# Patient Record
Sex: FEMALE | Race: WHITE | NOT HISPANIC OR LATINO | Employment: OTHER | URBAN - METROPOLITAN AREA
[De-identification: names, ages, dates, MRNs, and addresses within clinical notes are randomized per-mention and may not be internally consistent; named-entity substitution may affect disease eponyms.]

---

## 2017-03-22 ENCOUNTER — OFFICE VISIT (OUTPATIENT)
Dept: PHYSICAL MEDICINE AND REHAB | Facility: CLINIC | Age: 72
End: 2017-03-22

## 2017-03-22 VITALS — DIASTOLIC BLOOD PRESSURE: 73 MMHG | HEIGHT: 60 IN | HEART RATE: 83 BPM | SYSTOLIC BLOOD PRESSURE: 179 MMHG

## 2017-03-22 DIAGNOSIS — M62.838 MUSCLE SPASM: ICD-10-CM

## 2017-03-22 DIAGNOSIS — G80.1 SPASTIC DIPLEGIC CEREBRAL PALSY (H): Primary | ICD-10-CM

## 2017-03-22 RX ORDER — MULTIVIT WITH MINERALS/LUTEIN
1 TABLET ORAL DAILY
COMMUNITY

## 2017-03-22 RX ORDER — BACLOFEN 10 MG/1
10-20 TABLET ORAL 3 TIMES DAILY
Qty: 180 TABLET | Refills: 11 | Status: SHIPPED | OUTPATIENT
Start: 2017-03-22 | End: 2018-07-11

## 2017-03-22 ASSESSMENT — PAIN SCALES - GENERAL: PAINLEVEL: WORST PAIN (10)

## 2017-03-22 NOTE — MR AVS SNAPSHOT
After Visit Summary   3/22/2017    Benji Shankar    MRN: [id number]           Patient Information     Date Of Birth          1945        Visit Information        Provider Department      3/22/2017 11:20 AM Domingo Posey MD [facility]        Today's Diagnoses     Muscle spasm    -  1      Care Instructions    1. To address spasticity and knees pinching together, first try increasing baclofen first to 15 mg three times daily for a week. Consider then increasing to 20 mg times daily. Monitor for sleepiness.    2. Try putting in some padding / towels etc on side of thighs to simulate a narrower chair and if this is helping the discomfort.    3. Will pursue the equipment you outlined including bed pan, shower bench and gel wheelchair cushion.    4. I'll look into if there is a topical medication that would be helpful.    5. Allow your feet to rotate outward some to their natural resting position to see if this minimizes the ache in feet.    6. Monitor the red spot on top of your feet - shoes may be too tight. Can trial with AFO's on but shoes off when at home to see if the red area is less and more comfortable.     7. Physical therapy with Laurie at the [facility] to work on transfers, exercises and positioning in wheelchair.    8. See me in a few months.          Follow-ups after your visit        Your next 10 appointments already scheduled     Jul 26, 2017 10:40 AM CDT   (Arrive by 10:25 AM)   Return Visit with Domingo Posey MD   [facility] ([facility] and [place])    [address] [place] [id number] [phone]              Who to contact     Please call your clinic at [phone] to:    Ask questions about your health    Make or cancel appointments    Discuss your medicines    Learn about your test results    Speak to your doctor   If you have compliments or concerns about  an experience at your clinic, or if you wish to file a complaint, please contact [facility] Patient Relations at [phone] or email us at [email or web address]         Additional Information About Your Visit        [employer] Information     V[employer]im[employer] is an electronic gateway that provides easy, online access to your medical records. With Vizimax, you can request a clinic appointment, read your test results, renew a prescription or communicate with your care team.     To sign up for Vizimax visit the website at [email or web address]   You will be asked to enter the access code listed below, as well as some personal information. Please follow the directions to create your username and password.     Your access code is: [email or web address]  Expires: 2017  7:30 AM     Your access code will  in 90 days. If you need help or a new code, please contact your [facility] or call [phone] for assistance.        Care EveryWhere ID     This is your Care EveryWhere ID. This could be used by other organizations to access your [facility] medical records  [id number]        Your Vitals Were     Pulse Height                83 1.524 m (5')           Blood Pressure from Last 3 Encounters:   17 179/73   16 155/77   03/10/14 133/72    Weight from Last 3 Encounters:   16 64.4 kg (142 lb)   03/10/14 62.6 kg (138 lb)   12 61.2 kg (135 lb)              Today, you had the following     No orders found for display         Today's Medication Changes          These changes are accurate as of: 3/22/17 12:54 PM.  If you have any questions, ask your nurse or doctor.               These medicines have changed or have updated prescriptions.        Dose/Directions    baclofen 10 MG tablet   Commonly known as:  LIORESAL   This may have changed:  how much to take   Used for:  Muscle spasm   Changed by:  Domingo Posey MD        Dose:  10-20 mg   Take  1-2 tablets (10-20 mg) by mouth 3 times daily   Quantity:  180 tablet   Refills:  11            Where to get your medicines      These medications were sent to [facility]/pharmacy #[id number] - [place], [place] - [address] [facility]  [address] [place], [place] [address] [address]     Phone:  [phone]     baclofen 10 MG tablet                Primary Care Provider Office Phone # Fax #    Marcus Santana [phone]       [facility] [address]  [facility] [address]        Thank you!     Thank you for choosing [facility] PHYSICAL MEDICINE AND REHABILITATION  for your care. Our goal is always to provide you with excellent care. Hearing back from our patients is one way we can continue to improve our services. Please take a few minutes to complete the written survey that you may receive in the mail after your visit with us. Thank you!             Your Updated Medication List - Protect others around you: Learn how to safely use, store and throw away your medicines at www.disposemymeds.org.          This list is accurate as of: 3/22/17 12:54 PM.  Always use your most recent med list.                   Brand Name Dispense Instructions for use    ACETAMIN 500 MG tablet   Generic drug:  acetaminophen      Take 1-2 tablets by mouth every 6 hours as needed.       albuterol 90 MCG/ACT inhaler      Inhale 2 puffs into the lungs every 6 hours as needed.       baclofen 10 MG tablet    LIORESAL    180 tablet    Take 1-2 tablets (10-20 mg) by mouth 3 times daily       BUPROPION HCL PO      Take 150 mg by mouth daily       CALTRATE 600+D PLUS PO      Take 2 tablets by mouth daily.       captopril 50 MG tablet    CAPOTEN     Take  by mouth. 2 tablets in am and 1 tablet in pm       CENTRUM SILVER per tablet      Take 1 tablet by mouth daily       EVISTA 60 MG tablet   Generic drug:  raloxifene      Take 60 mg by mouth daily.       fluticasone 100 MCG/BLIST Aepb    FLOVENT DISKUS     Inhale 2 puffs into the lungs  every 12 hours       gabapentin 8 % Gel topical PLO cream     60 g    Apply 2-4 g topically 2 times daily as needed To bilateral feet       HYDROcodone-acetaminophen 5-325 MG per tablet    NORCO    30 tablet    Take 1 tablet by mouth every 4 hours as needed.       priLOSEC 20 MG CR capsule   Generic drug:  omeprazole      Take 20 mg by mouth 2 times daily.       QVAR 40 MCG/ACT Inhaler   Generic drug:  beclomethasone      Inhale 2 puffs into the lungs 2 times daily as needed.       triamterene-hydrochlorothiazide 37.5-25 MG per tablet    MAXZIDE-25     Take 1 tablet by mouth daily.       ZOLOFT 100 MG tablet   Generic drug:  sertraline      Take 100 mg by mouth daily.

## 2017-03-22 NOTE — LETTER
3/22/2017       RE: Benji Shankar  [address]    [facility] [address]     Dear Colleague,    Thank you for referring your patient, Benji Shankar, to the [facility] PH[facility] AND REHABILITATION at [facility]. Please see a copy of my visit note below.    HISTORY OF PRESENT ILLNESS:  Benji, who goes by Ahsan, is a pleasant now 72-year-old woman with long-standing spastic diplegic cerebral palsy who I last saw March 2016. She returns today with concerns of worsening function. She's having harder time with transfers. Her PCA Sofiya can do this assist though some other care givers are not able to do this as well and has had to resort some to bedpan use. Finds with toileting her legs don't open as well preferring to adduct causing some incontinence and urination on the floor even when seated over toilet.She feels some stiffness especially with leg adduction but not necessarily with full muscle cramping. She takes baclofen 10 mg TID. Doesn't recall using higher doses. For bowel and bladder management, Ahsan has been dependent for assistance to transfer to the toilet for a longer time. Overnight she wears protective undergarments to collect any bladder incontinence.     She has a lot of aches and pains. Her feet and ankles in particular but her buttocks and back as well. She has a power wheel chair with tilt and power leg elevation. Assist transfer in / out including toilet but is otherwise up independent in this most all the day. She reports the wheelchair never has fit well. Had complained of issues in the past when first getting it but they never found something that helped make it more comfortable. She feels it's too wide and can bump into doors or objects as she's not as used to the wider footprint. For her pain in the past there was a component of some bothersome tingling in her feet. Discussed oral medication options in past but  "wanting to minimize any more pills thus had Rx'ed some topical gabapentin gel. This was denied as non-formulary. She has a letter with her from pharmacy saying the \"Pentravan Plus\" cream is approved though that's not at all familiar to me nor do I feel I ordered that. Exploring what it may be, it may be carrier emollient cream that possibly could be used to compound the gabapentin into. Will need to explore this further.    Ahsan also is concerned her feet are turning out more and needing to be strapped to the w/c foot plate. She wears bilateral AFO's into shoes.     PHYSICAL EXAMINATION:    /73 (BP Location: Left arm, Patient Position: Chair, Cuff Size: Adult Regular)  Pulse 83  Ht 5' (1.524 m)   In general, Ahsan is alert, rather tearful today with concerns of loosing function. She is a good historian with fluent speech and language.  She arrives with her J6 power wheelchair, power tilt and independent foot/leg elevation, left side joystick control, flip up foot plate. Width of seat actually looks good. Has some obesity to buttocks in particular and there isn't much room for narrower chair. Would need to be pushed in against skin / thighs though some people prefer this controlled / held in fit. Her feet are Velcro strapped down. Cushion is a contoured foam, no gel or air. She has bilateral tibial torsion with external rotation which is longstanding for her though note the shoes are strapped in pointing more foreward.  She wears bilateral solid ankle-foot orthoses into light weight tennis shoes.  I removed these today, the right food on dorsum over 1st metatarsal area has blanchable erythema. No skin breakdown. There is some swelling in bilateral feet 1-2+. There is some slight venous congestion and purple color easily blanchable.  Palpation through the arches has some tenderness especially on left. Also some tenderness palpation over erythematous area as above. She has some pes planus as well as " "metatarsal lateral deviation slightly.   Tone exam isn't particularly elevated today. While there is some hip adduction and knee flex / ext tone, would grade it just 1/4 Leopoldo. There is some restriction on end range with all of these though within the range there isn't a lot of resistance.     ASSESSMENT AND RECOMMENDATIONS: Overall suspect no new neurologic issues but rather some aging in person with static CP.    1. To address spasticity and knees pinching together, first try increasing baclofen first to 15 mg three times daily for a week. Consider then increasing to 20 mg times daily. Monitor for sleepiness. May have some more dynamic tone and for example during toileting, there may well be more adductor tone impacting the positioning and function. If not effective with oral medication trial, could consider injection medication to adductors.    2. Wheelchair \"not fitting\" or at least complaint of being uncomfortable. Suspect pain may be multifactorial though not sure it's the fit. Possibly there foot plate elevation could be raised. Noting with leg extension in w/c this sometimes pushes up too much. For now can trial putting in some padding / towels etc on side of thighs to simulate a narrower chair and if this is helping the discomfort, then could pursue something more durable. Suspect with the duration she's in the chair a gel wheelchair cushion may be more comfortable. Without skin breakdown reported, a ROHO may be too soft and harder to get in / out. Will have w/c expertise / insights from Renetta THACKER at [facility].    3. Will pursue the equipment she's needing including larger bed pan, shower bench. She has used Dominic Matamoros at [facility], medical .    4. Will look into if there is a topical medication that would be helpful but suspect it's more positioning.     5. Allow your feet to rotate outward some to their natural resting position to see if this minimizes the ache in " feet. Shifting the anchor location on the foot Velcro straps may be helpful.     6. Monitor the red spot on top of your feet - shoes may be too tight. Can trial with AFO's on but shoes off when at home to see if the red area is less and more comfortable.     7. Physical therapy with Laurie (has worked with in the past) at the [facility] to work on transfers, exercises and positioning in wheelchair.     8. Follow up with Benji in 3 months.  She will contact us sooner, especially if we want to try alternate options for her lower extremity pains.  I educated her that if the paresthesias worsen or start to progress more proximally, I would likely recommend an EMG at that time.      Seventy minutes spent in direct patient interaction, greater than 50% counseling and education of above detailed items.      Domingo Posey MD

## 2017-03-22 NOTE — PATIENT INSTRUCTIONS
1. To address spasticity and knees pinching together, first try increasing baclofen first to 15 mg three times daily for a week. Consider then increasing to 20 mg times daily. Monitor for sleepiness.    2. Try putting in some padding / towels etc on side of thighs to simulate a narrower chair and if this is helping the discomfort.    3. Will pursue the equipment you outlined including bed pan, shower bench and gel wheelchair cushion.    4. I'll look into if there is a topical medication that would be helpful.    5. Allow your feet to rotate outward some to their natural resting position to see if this minimizes the ache in feet.    6. Monitor the red spot on top of your feet - shoes may be too tight. Can trial with AFO's on but shoes off when at home to see if the red area is less and more comfortable.     7. Physical therapy with Laurie at the [facility] to work on transfers, exercises and positioning in wheelchair.    8. See me in a few months.

## 2017-03-25 NOTE — PROGRESS NOTES
"HISTORY OF PRESENT ILLNESS:  Benji, who goes by Ahsan, is a pleasant now 72-year-old woman with long-standing spastic diplegic cerebral palsy who I last saw March 2016. She returns today with concerns of worsening function. She's having harder time with transfers. Her PCA Sofiya can do this assist though some other care givers are not able to do this as well and has had to resort some to bedpan use. Finds with toileting her legs don't open as well preferring to adduct causing some incontinence and urination on the floor even when seated over toilet.She feels some stiffness especially with leg adduction but not necessarily with full muscle cramping. She takes baclofen 10 mg TID. Doesn't recall using higher doses. For bowel and bladder management, Ahsan has been dependent for assistance to transfer to the toilet for a longer time. Overnight she wears protective undergarments to collect any bladder incontinence.     She has a lot of aches and pains. Her feet and ankles in particular but her buttocks and back as well. She has a power wheel chair with tilt and power leg elevation. Assist transfer in / out including toilet but is otherwise up independent in this most all the day. She reports the wheelchair never has fit well. Had complained of issues in the past when first getting it but they never found something that helped make it more comfortable. She feels it's too wide and can bump into doors or objects as she's not as used to the wider footprint. For her pain in the past there was a component of some bothersome tingling in her feet. Discussed oral medication options in past but wanting to minimize any more pills thus had Rx'ed some topical gabapentin gel. This was denied as non-formulary. She has a letter with her from pharmacy saying the \"Pentravan Plus\" cream is approved though that's not at all familiar to me nor do I feel I ordered that. Exploring what it may be, it may be carrier emollient cream that " possibly could be used to compound the gabapentin into. Will need to explore this further.    Ahsan also is concerned her feet are turning out more and needing to be strapped to the w/c foot plate. She wears bilateral AFO's into shoes.     PHYSICAL EXAMINATION:    /73 (BP Location: Left arm, Patient Position: Chair, Cuff Size: Adult Regular)  Pulse 83  Ht 5' (1.524 m)   In general, Ahsan is alert, rather tearful today with concerns of loosing function. She is a good historian with fluent speech and language.  She arrives with her J6 power wheelchair, power tilt and independent foot/leg elevation, left side joystick control, flip up foot plate. Width of seat actually looks good. Has some obesity to buttocks in particular and there isn't much room for narrower chair. Would need to be pushed in against skin / thighs though some people prefer this controlled / held in fit. Her feet are Velcro strapped down. Cushion is a contoured foam, no gel or air. She has bilateral tibial torsion with external rotation which is longstanding for her though note the shoes are strapped in pointing more foreward.  She wears bilateral solid ankle-foot orthoses into light weight tennis shoes.  I removed these today, the right food on dorsum over 1st metatarsal area has blanchable erythema. No skin breakdown. There is some swelling in bilateral feet 1-2+. There is some slight venous congestion and purple color easily blanchable.  Palpation through the arches has some tenderness especially on left. Also some tenderness palpation over erythematous area as above. She has some pes planus as well as metatarsal lateral deviation slightly.   Tone exam isn't particularly elevated today. While there is some hip adduction and knee flex / ext tone, would grade it just 1/4 Leopoldo. There is some restriction on end range with all of these though within the range there isn't a lot of resistance.     ASSESSMENT AND RECOMMENDATIONS: Overall  "suspect no new neurologic issues but rather some aging in person with static CP.    1. To address spasticity and knees pinching together, first try increasing baclofen first to 15 mg three times daily for a week. Consider then increasing to 20 mg times daily. Monitor for sleepiness. May have some more dynamic tone and for example during toileting, there may well be more adductor tone impacting the positioning and function. If not effective with oral medication trial, could consider injection medication to adductors.    2. Wheelchair \"not fitting\" or at least complaint of being uncomfortable. Suspect pain may be multifactorial though not sure it's the fit. Possibly there foot plate elevation could be raised. Noting with leg extension in w/c this sometimes pushes up too much. For now can trial putting in some padding / towels etc on side of thighs to simulate a narrower chair and if this is helping the discomfort, then could pursue something more durable. Suspect with the duration she's in the chair a gel wheelchair cushion may be more comfortable. Without skin breakdown reported, a ROHO may be too soft and harder to get in / out. Will have w/c expertise / insights from Renetta THACKER at [facility].    3. Will pursue the equipment she's needing including larger bed pan, shower bench. She has used Dominic Matamoros at [facility], medical .    4. Will look into if there is a topical medication that would be helpful but suspect it's more positioning.     5. Allow your feet to rotate outward some to their natural resting position to see if this minimizes the ache in feet. Shifting the anchor location on the foot Velcro straps may be helpful.     6. Monitor the red spot on top of your feet - shoes may be too tight. Can trial with AFO's on but shoes off when at home to see if the red area is less and more comfortable.     7. Physical therapy with Laurie (has worked with in the past) at the [facility] to work on transfers, exercises and positioning in wheelchair.     8. Follow up with Benji in 3 months.  She will contact us sooner, especially if we want to try alternate options for her lower extremity pains.  I educated her that if the paresthesias worsen or start to progress more proximally, I would likely recommend an EMG at that time.      Seventy minutes spent in direct patient interaction, greater than 50% counseling and education of above detailed items.      Domingo Posey MD

## 2017-03-27 ENCOUNTER — CARE COORDINATION (OUTPATIENT)
Dept: PHYSICAL MEDICINE AND REHAB | Facility: CLINIC | Age: 72
End: 2017-03-27

## 2017-03-27 NOTE — PROGRESS NOTES
Orders from Dr Posey for an extended tub bench, extra large bed pan, and a gel wheelchair cushion was faxed to [facility] [phone] per patient's request.

## 2017-04-11 ENCOUNTER — HOSPITAL ENCOUNTER (OUTPATIENT)
Dept: PHYSICAL THERAPY | Facility: CLINIC | Age: 72
Setting detail: THERAPIES SERIES
End: 2017-04-11
Attending: PHYSICAL MEDICINE & REHABILITATION
Payer: COMMERCIAL

## 2017-04-11 PROCEDURE — 97530 THERAPEUTIC ACTIVITIES: CPT | Mod: GP | Performed by: PHYSICAL THERAPIST

## 2017-04-11 PROCEDURE — 97162 PT EVAL MOD COMPLEX 30 MIN: CPT | Mod: GP | Performed by: PHYSICAL THERAPIST

## 2017-04-11 PROCEDURE — 40000719 ZZHC STATISTIC PT DEPARTMENT NEURO VISIT: Performed by: PHYSICAL THERAPIST

## 2017-04-11 PROCEDURE — 97542 WHEELCHAIR MNGMENT TRAINING: CPT | Mod: GP | Performed by: PHYSICAL THERAPIST

## 2017-04-12 NOTE — PROGRESS NOTES
" 04/11/17 1500   Quick Adds   Type of Visit Initial OP PT Evaluation   General Information   Start of Care Date 04/11/17   Referring Physician Dr Domingo Posey   Orders Evaluate and Treat as Indicated   Order Date 03/24/17   Medical Diagnosis Spastic diplegic cerebral palsy    Onset of illness/injury or Date of Surgery 03/24/17   Special Instructions Special Instructions/Modalities: 71 yo with CP, some worsening abilities in transfers and some spasticity. See for optimizing ROM, HEP and function. Special Programs: Has worked most with Laurie Stein at [facility] in past. Not for years. See PM&R note for thoughts.    Surgical/Medical history reviewed Yes   Pertinent history of current problem (include personal factors and/or comorbidities that impact the POC) Pt is known to this clinician from past episode. Pt with CP, been primary power w/c user for years. Lately with more pain while in power w/c (issued 2014) - states it has never been right, very frustrated with that. Changed from [facility] to [facility] ([facility]) due to dissatisfaction with response from [facility]. Pt notes that transfers are getting harder - has been left bedridden a couple of weekends becuase weekend PCA couldn't get her transferred. Pt noted they did try using her [employer] [place] standing frame as a transfer device, but the strap got in the way of hygeine cares/clothing mangement. Pt notes she does standing frame 1-2hrs per day, has not been doing her strengthening ex for UEs and trunk. Complexity: chronic pain with \"arthritis all over.\" CP immobility.   Patient role/Employment history Disabled   Living environment Apartment/condo   Home/Community Accessibility Comments elevator   Current Assistive Devices Power Wheel Chair;Transfer Board   ADL Devices Wall Grab Bar;Other  (taller toilet)   Patient/Family Goals Statement I want to feel better in my w/c and be able to transfer with my weekend person.   General Information Comments PCAs: " "Sofiya and Eun. 4-6hrs per day. AM and PM cares, showering, dressing, transferring and toileting, cleaning and laundry. Doing own cooking, taking Star (dog) out to potty.  [employer] or Sofiya will drive.   Fall Risk Screen   Fall screen completed by PT   Per patient - Fall 2 or more times in past year? Yes   Per patient - Fall with injury in past year? No   Is patient a fall risk? Yes   Fall screen comments pt notes she couldn't tell me when or how she has fallen, but it was \"2 or 3x\".   System Outcome Measures   AM-PAC  Basic Mobility Score Level  (Lower scores equate to lower levels of function) 45.73  (ck)   AM-PAC  Daily Activity Score Level  (Lower scores equate to lower levels of function) 47.69  (ck)   AM-PAC  Applied Cognitive Score Level  (Lower scores equate to lower levels of function) 39.87  (cj)   Pain   Patient currently in pain Yes   Pain location \"All over\" worse since got this power w/c.   Pain description comment pt notes it is worse in her sit bones of pelvis, and HS.   Cognitive Status Examination   Orientation orientation to person, place and time   Level of Consciousness alert   Follows Commands and Answers Questions 100% of the time;able to follow multistep instructions   Personal Safety and Judgment intact   Memory intact   Posture   Posture Comments seated: sacral sitting, forward head, protracted shoulders. Standing in // bars: wide WENDIE, but significant adduction of knees (WB on medal aspects of feet, especially R), knee and hip flex ~30* B - firm positioning.   Range of Motion (ROM)   ROM Comment DF 10*, but with excessive pronation. HS 90-90 R -10*, L -25*. Pt not assessed in supine today due to other issues being addressed. UEs WNL.   MMT: Hip, Rehab Eval   Hip Flexion - Left Side (3-/5) fair minus, left   Hip ABduction - Left Side (3-/5) fair minus, left   Hip ADduction - Left Side (3/5) fair, left   Hip Flexion - Right Side (2+/5) poor plus, right   Hip ABduction - Right Side " (2+/5) poor plus, right   Hip ADduction - Right Side (3-/5) fair minus, right   MMT: Knee, Rehab Eval   Knee Flexion - Left Side (3-/5) fair minus, left   Knee Extension - Left Side (3+/5) fair plus, left   Knee Flexion - Right Side (1/5) trace, right   Knee Extension - Right Side (3-/5) fair minus, right   Bed Mobility   Bed Mobility Comments NT today. Per pt report, max A with bedrail.   Transfer Skills   Transfer Comments NT today - focus of next session. Min A sit/ // bars to flexed position (see above). Per pt, she stands with use of bar on bed, max A pivot via PCA, then sits and max A sit/sup and reposition in bed.   Locomotion   Wheel Chair Mobility Comments mod I power w/c.   Balance   Balance Comments Pt unable to lean forward to manage footrest in w/c. Pt is able to sit forward in w/c with feet on floor mod I.   Muscle Tone   Muscle Tone Comments Leopoldo 1-2 throughout B LEs. With standing, no signficant spasticity noted functionally with adductors, possibly with HS. Pt noted she increased baclofen from 10 to 15mg.   Planned Therapy Interventions   Planned Therapy Interventions neuromuscular re-education;ROM;strengthening;stretching;transfer training;manual therapy;balance training;bed mobility training   Planned Therapy Interventions Comment w/c positioing troubleshooting, potential orthotics change.   Clinical Impression   Criteria for Skilled Therapeutic Interventions Met yes, treatment indicated   PT Diagnosis deconditioning, impaired transfers and bed mobility.   Influenced by the following impairments decreased LE strength, increasd LE tone, decreased activity tolerance, impaired posture, chronic OA pain.   Functional limitations due to impairments discomfort and impaired positioning in power w/c, impaired bed mobility and transfers.   Clinical Presentation Evolving/Changing   Clinical Presentation Rationale pt has had decline in function in the last few months, resulting in being bedridden  on some weekends.   Clinical Decision Making (Complexity) Moderate complexity   Therapy Frequency 1 time/week   Predicted Duration of Therapy Intervention (days/wks) 90 days   Risk & Benefits of therapy have been explained Yes   Patient, Family & other staff in agreement with plan of care Yes   Clinical Impression Comments potential for home safety eval due to pt's specific environmental needs for transferring/bed mobility.   Goal 1   Goal Identifier Transfers   Goal Description Pt demo ability to transfer with weekend staff consistently via use of improved techniques and/or equipment.   Target Date 07/09/17   Goal 2   Goal Identifier Comfort in power w/c   Goal Description Pt demo improved positioning in power w/c as result of modifications made via collaboration with vendor, and improved trunk/LE ROM.   Target Date 07/09/17   Goal 3   Goal Identifier HEP   Goal Description Pt demo indep with (including possible direction of PCAs) HEP for LE/trunk/UE ROM and strength for ongoing wellness.   Target Date 07/09/17   Total Evaluation Time   Total Evaluation Time (Minutes) 35

## 2017-04-18 ENCOUNTER — HOSPITAL ENCOUNTER (OUTPATIENT)
Dept: PHYSICAL THERAPY | Facility: CLINIC | Age: 72
Setting detail: THERAPIES SERIES
End: 2017-04-18
Attending: PHYSICAL MEDICINE & REHABILITATION
Payer: COMMERCIAL

## 2017-04-18 PROCEDURE — 97530 THERAPEUTIC ACTIVITIES: CPT | Mod: GP | Performed by: PHYSICAL THERAPIST

## 2017-04-18 PROCEDURE — 40000719 ZZHC STATISTIC PT DEPARTMENT NEURO VISIT: Performed by: PHYSICAL THERAPIST

## 2017-04-25 ENCOUNTER — HOSPITAL ENCOUNTER (OUTPATIENT)
Dept: PHYSICAL THERAPY | Facility: CLINIC | Age: 72
Setting detail: THERAPIES SERIES
End: 2017-04-25
Attending: PHYSICAL MEDICINE & REHABILITATION
Payer: COMMERCIAL

## 2017-04-25 PROCEDURE — 40000719 ZZHC STATISTIC PT DEPARTMENT NEURO VISIT: Performed by: PHYSICAL THERAPIST

## 2017-04-25 PROCEDURE — 97530 THERAPEUTIC ACTIVITIES: CPT | Mod: GP | Performed by: PHYSICAL THERAPIST

## 2017-05-01 ENCOUNTER — HOSPITAL ENCOUNTER (OUTPATIENT)
Dept: PHYSICAL THERAPY | Facility: CLINIC | Age: 72
Setting detail: THERAPIES SERIES
End: 2017-05-01
Attending: PHYSICAL MEDICINE & REHABILITATION
Payer: COMMERCIAL

## 2017-05-01 PROCEDURE — 40000719 ZZHC STATISTIC PT DEPARTMENT NEURO VISIT: Performed by: PHYSICAL THERAPIST

## 2017-05-01 PROCEDURE — 97110 THERAPEUTIC EXERCISES: CPT | Mod: GP | Performed by: PHYSICAL THERAPIST

## 2017-05-09 ENCOUNTER — HOSPITAL ENCOUNTER (OUTPATIENT)
Dept: PHYSICAL THERAPY | Facility: CLINIC | Age: 72
Setting detail: THERAPIES SERIES
End: 2017-05-09
Attending: PHYSICAL MEDICINE & REHABILITATION
Payer: COMMERCIAL

## 2017-05-09 PROCEDURE — 97530 THERAPEUTIC ACTIVITIES: CPT | Mod: GP | Performed by: PHYSICAL THERAPIST

## 2017-05-09 PROCEDURE — 40000719 ZZHC STATISTIC PT DEPARTMENT NEURO VISIT: Performed by: PHYSICAL THERAPIST

## 2017-05-16 ENCOUNTER — HOSPITAL ENCOUNTER (OUTPATIENT)
Dept: PHYSICAL THERAPY | Facility: CLINIC | Age: 72
Setting detail: THERAPIES SERIES
End: 2017-05-16
Attending: PHYSICAL MEDICINE & REHABILITATION
Payer: COMMERCIAL

## 2017-05-16 PROCEDURE — 40000719 ZZHC STATISTIC PT DEPARTMENT NEURO VISIT: Performed by: PHYSICAL THERAPIST

## 2017-05-16 PROCEDURE — 97530 THERAPEUTIC ACTIVITIES: CPT | Mod: GP | Performed by: PHYSICAL THERAPIST

## 2017-05-16 NOTE — DISCHARGE INSTRUCTIONS
Please measure the height of the seat for the manual wheelchair:    AND the seat of Sofiya's passenger seat:      Either call and leave me a voicemail ([phone]) or bring it next time.

## 2017-05-16 NOTE — IP AVS SNAPSHOT
MRN:[id number]                      After Visit Summary   5/16/2017    Benji Shankar    MRN: [id number]           Visit Information        Provider Department      5/16/2017 11:45 AM Kimberly Stein, PT [facility], [place], Physical Therapy - Outpatient        Your next 10 appointments already scheduled     May 23, 2017 11:00 AM CDT   Treatment 45 with Kimberly Stein, PT   [facility], [place], Physical Therapy - Outpatient ([facility])    [address], [address]  [place] [address]   [phone]            Jun 06, 2017 10:15 AM CDT   Treatment 45 with Kimberly Stein, PT   [facility], [place], Physical Therapy - Outpatient ([facility])    [address], [address]  [place] [address]   [phone]            Jun 13, 2017 10:30 AM CDT   Treatment 45 with Kimberly Stein, PT   [facility], [place], Physical Therapy - Outpatient ([facility])    [address], [address]  [place] [address]   [phone]            Jun 20, 2017 10:30 AM CDT   Treatment 45 with Kimberyl Stein PT   [facility], [place], Physical Therapy - Outpatient ([facility])    [address], [address]  [place] [address]   [phone]            Jul 26, 2017 10:40 AM CDT   (Arrive by 10:25 AM)   Return Visit with Domingo Posey MD   [facility] and Rehabilitation ([facility])    [address] [place] [address] [id number] [phone]                Further instructions from your care team       Please measure the height of the seat for the manual wheelchair:    AND the seat of Sofiya's passenger seat:      Either call and leave me a voicemail ([phone]) or bring it next time.          MyChart Information     [employer] lets you send messages to your doctor, view your test results, renew  "your prescriptions, schedule appointments and more. To sign up, go to [email or web address] . Click on \"Log in\" on the left side of the screen, which will take you to the Welcome page. Then click on \"Sign up Now\" on the right side of the page.     You will be asked to enter the access code listed below, as well as some personal information. Please follow the directions to create your username and password.     Your access code is: [email or web address]  Expires: 2017  7:30 AM     Your access code will  in 90 days. If you need help or a new code, please call your [place] clinic or [phone].        Care EveryWhere ID     This is your Care EveryWhere ID. This could be used by other organizations to access your [place] medical records  [id number]        "

## 2017-05-23 ENCOUNTER — HOSPITAL ENCOUNTER (OUTPATIENT)
Dept: PHYSICAL THERAPY | Facility: CLINIC | Age: 72
Setting detail: THERAPIES SERIES
End: 2017-05-23
Attending: PHYSICAL MEDICINE & REHABILITATION
Payer: COMMERCIAL

## 2017-05-23 PROCEDURE — 40000719 ZZHC STATISTIC PT DEPARTMENT NEURO VISIT: Performed by: PHYSICAL THERAPIST

## 2017-05-23 PROCEDURE — 97530 THERAPEUTIC ACTIVITIES: CPT | Mod: GP | Performed by: PHYSICAL THERAPIST

## 2017-06-06 ENCOUNTER — HOSPITAL ENCOUNTER (OUTPATIENT)
Dept: PHYSICAL THERAPY | Facility: CLINIC | Age: 72
Setting detail: THERAPIES SERIES
End: 2017-06-06
Attending: PHYSICAL MEDICINE & REHABILITATION
Payer: COMMERCIAL

## 2017-06-06 PROCEDURE — 40000719 ZZHC STATISTIC PT DEPARTMENT NEURO VISIT: Performed by: PHYSICAL THERAPIST

## 2017-06-06 PROCEDURE — 97530 THERAPEUTIC ACTIVITIES: CPT | Mod: GP | Performed by: PHYSICAL THERAPIST

## 2017-06-20 ENCOUNTER — HOSPITAL ENCOUNTER (OUTPATIENT)
Dept: PHYSICAL THERAPY | Facility: CLINIC | Age: 72
Setting detail: THERAPIES SERIES
End: 2017-06-20
Attending: PHYSICAL MEDICINE & REHABILITATION
Payer: COMMERCIAL

## 2017-06-20 PROCEDURE — 97530 THERAPEUTIC ACTIVITIES: CPT | Mod: GP | Performed by: PHYSICAL THERAPIST

## 2017-06-20 PROCEDURE — 40000719 ZZHC STATISTIC PT DEPARTMENT NEURO VISIT: Performed by: PHYSICAL THERAPIST

## 2017-07-24 ENCOUNTER — HOSPITAL ENCOUNTER (OUTPATIENT)
Dept: PHYSICAL THERAPY | Facility: CLINIC | Age: 72
Setting detail: THERAPIES SERIES
End: 2017-07-24
Attending: PHYSICAL MEDICINE & REHABILITATION
Payer: COMMERCIAL

## 2017-07-24 PROCEDURE — 40000178 ZZH STATISTIC PT HOME VISIT-NEURO/BAL: Performed by: PHYSICAL THERAPIST

## 2017-07-24 PROCEDURE — 97530 THERAPEUTIC ACTIVITIES: CPT | Mod: GP | Performed by: PHYSICAL THERAPIST

## 2017-07-26 ENCOUNTER — OFFICE VISIT (OUTPATIENT)
Dept: PHYSICAL MEDICINE AND REHAB | Facility: CLINIC | Age: 72
End: 2017-07-26

## 2017-07-26 VITALS
BODY MASS INDEX: 29.64 KG/M2 | TEMPERATURE: 97.1 F | SYSTOLIC BLOOD PRESSURE: 128 MMHG | OXYGEN SATURATION: 98 % | HEIGHT: 60 IN | WEIGHT: 151 LBS | HEART RATE: 74 BPM | DIASTOLIC BLOOD PRESSURE: 80 MMHG | RESPIRATION RATE: 24 BRPM

## 2017-07-26 DIAGNOSIS — G80.1 SPASTIC DIPLEGIC CEREBRAL PALSY (H): Primary | ICD-10-CM

## 2017-07-26 ASSESSMENT — PAIN SCALES - GENERAL: PAINLEVEL: EXTREME PAIN (8)

## 2017-07-26 NOTE — LETTER
7/26/2017       RE: Benji Shankar  [address]    [place] [address]     Dear Colleague,    Thank you for referring your patient, Benji Shankar, to the [facility] PHYSICAL MEDICINE AND REHABILITATION at [facility]. Please see a copy of my visit note below.    Benji, who goes by Ahsan, is a pleasant now 72-year-old woman with long-standing spastic diplegic cerebral palsy who I last saw March 2017. She returns today with several concerns of worsening function and swallow.   She had been hospitalized at [facility] with swallow difficulties. She has some difficulty articulating the specifics on what all had been done and recommended. She signed release of information forms for Care Everywhere and I reviewed some of the recent notes from there during her visit. Looks like there were few EGD's completed, I copied results below for EMR reference and reviewed with her. It seems there were no dysphagia changes to her diet recommended but encouraged her to chew more thoroughly and take small bites. She's scheduled for f/u with her gastroenterologist for this.    Additional concern with some slow changes over time and increasing difficulty with transfers. She has PCA support and the primary person Sofiya is a long time provider and has become friend. Sofiya is able to do stand pivot assist transfers though other providers use a sling. Ahsan had a recent home visit by physical therapist Laurie Stein. They had a trial for a Cesilia Steady which was very effective and we're pursuing a letter of necessity for this DME. Ahsan also reports some questions have been raised with her AFO's and some more purple color on her skin. This isn't painful and no skin breakdown. Her PCA monitors skin close.    Ahsan has moderate amount of tearfulness and struggles with aging with disability and slow loss of function. She is on antidepressant but hasn't done any  counseling.    PHYSICAL EXAMINATION:    /80 (BP Location: Right arm, Patient Position: Sitting, Cuff Size: Adult Large)  Pulse 74  Temp 97.1  F (36.2  C) (Oral)  Resp 24  Ht 5' (1.524 m)  Wt 151 lb (68.5 kg)  SpO2 98%  Breastfeeding? No  BMI 29.49 kg/m2   Ahsan is alert, mostly pleasant but also tearful at times. She has fluent speech and language. Arrives with her power wheelchair which has power tilt and independent foot/leg elevation, left side joystick control, flip up foot plate.  Her feet are Velcro strapped down. Bilateral solid ankle AFO in leather shoes. She has bilateral tibial torsion with external rotation which is longstanding for her.  I removed these today, there is some more broad distribution purple blanchable skin but no pressure areas noted. She has pes planus as well as metatarsal lateral deviation slightly. AFO's are older but appearing in decent shape and fitting OK. Velcro has been replaced.     ASSESSMENT AND RECOMMENDATIONS:   1. Overall suspect no new neurologic issues but rather some aging in person with static CP.  2. Order for Cesilia Fajardo to assist with transfers. Has already had home visit with this and it's effective.  3. Swallow f/u with her established gastroenterologist Dr. Pablo for specific follow up recommendations though some SLP seems appropriate. This could be at the [facility]   4. AFO's seem to be functioning OK, no change.  5. No changes to her spasticity management, continues baclofen.  6. Discussed aging with disability and adjustment. Do feel psychology counseling would be good but she's not open to that idea. Continues on antidepressant.  7. Follow up with Benji in 6 months.  She will contact us sooner if need arises.      50 minutes spent in direct patient interaction, greater than 50% counseling and education of above detailed items.      Domingo Posey MD     EGD 7/21:  Findings:       Localized moderate mucosal changes characterized by  smoothness and        altered texture were found in the lower third of the esophagus. Biopsies        were taken with a cold forceps for histology. A TTS dilator was passed        through the scope. Dilation with an 18-19-20 mm balloon (to a maximum        balloon size of 20 mm) dilator was performed.       -No hiatal hernia noted.       Abnormal motility was noted in the esophagus. The cricopharyngeus was        normal. There are extra peristaltic waves of the esophageal body.        Tertiary peristaltic waves are noted.       The entire examined stomach was normal.       The 2nd part of the duodenum was normal.  Impression:       - Smooth, texture changed mucosa in the esophagus. Biopsied. Dilated to        20mm empirically as patient has had relief in symptoms with this        procedure. Suspect symptoms are more related to underlying age related        dysmotility, without obvious stricture or stenosis noted on exam.       - No hiatal hernia noted.       - The examination was suspicious for presbyesophagus.       - Normal stomach.       - Normal 2nd part of the duodenum.           EGD 5/22/2017   Findings:       Abnormal motility was noted in the esophagus. Tertiary peristaltic waves        are noted. Prior biopsies were unrevealing for eosiniphilic esohagitis.        A guidewire was placed and the scope was withdrawn. Dilation was        performed with a Savary dilator with no resistance at 18 mm. The        endoscope was reinserted and no laceratiions/tears/ active bleeding        noted.       A 5 cm hiatus hernia was present.       The entire examined stomach was normal.       The examined duodenum was normal.       Patchy ? candidiasis vs. food debris was found in the upper third of the        esophagus. Brushings were taken with a cold for analysis.  Impression:          - The examination was suspicious for presbyesophagus.                        Dilated.                       - 5 cm hiatus hernia.                        - Normal stomach.                       - Normal examined duodenum.                       - No specimens collected    Again, thank you for allowing me to participate in the care of your patient.    Sincerely,  Domingo Posey MD

## 2017-07-26 NOTE — PROGRESS NOTES
Outpatient Physical Therapy Discharge Note     Patient: Benji Shankar  : 1945    Beginning/End Dates of Reporting Period:  17 to 2017 (10 visits)    Referring Provider: Dr Domingo Posey    Therapy Diagnosis: deconditioning, impaired transfers and bed mobility.     Client Self Report: I was in the hospital ([facility]) Fri to Sat - felt like I was choking on my food. My esophagus has been stretched 2x this year and food is still getting stuck. Excited to try out the new transfer device.    Outcome Measures (most recent score):  AMPAC not attained due to last visit being home eval.   Goals:  Goal Identifier Transfers   Goal Description Pt demo ability to transfer with weekend staff consistently via use of improved techniques and/or equipment.   Target Date 17   Date Met  17   Progress: Pt with successful home eval with Cesilia Fajardo. [facility]. Additionally, pt demo ability to complete mock car transfer via slide board in order to get out more with PCA.     Goal Identifier Comfort in power w/c   Goal Description Pt demo improved positioning in power w/c as result of modifications made via collaboration with vendor, and improved trunk/LE ROM.   Target Date 17   Date Met  17   Progress: Pt has new Matrix seat cushion on order.     Goal Identifier HEP   Goal Description Pt demo indep with (including possible direction of PCAs) HEP for LE/trunk/UE ROM and strength for ongoing wellness.   Target Date 17   Date Met  17   Progress: HEP reviewed with PCA and updated to meet pt's current needs.        Progress Toward Goals:   Progress this reporting period: identified appropriate equipment for increased comfort and ease of transfers. Reviewed HEP with PCA and updated with current function for pt.      Plan:  Discharge from therapy.    Discharge:    Reason for Discharge: Patient has met all goals.    Equipment Issued: will be  ordering Cesilia Steady, and new Matrix cushion from [facility] (contact Dominic)    Discharge Plan: Patient to continue home program.

## 2017-07-26 NOTE — MR AVS SNAPSHOT
After Visit Summary   7/26/2017    Benji Shankar    MRN: [id number]           Patient Information     Date Of Birth          1945        Visit Information        Provider Department      7/26/2017 10:40 AM Domingo Posey MD [facility] Physical Medicine and Rehabilitation        Care Instructions    Copy from recent recommendations for Dr. Pablo's reference and hope he has specific thoughts given his knowledge with your history. Defer to his insights on swallow study and if additional work up is needed. SLP referral etc.  See EGD was done at [facility]. Also copy results below.    Call Dr. Pablo to discuss with him or clinic nurse the recent studies at [facility] and if there is need for additional procedure on 7/29 and where to go from here.    We are happy to do SLP for any swallow and / or therapy at the [facility] where you're seeing PT Laurie. I'm happy to order if that's what Dr. Pablo wants or he can order for there or his system.    Ahsan was in observation at [facility] [place] Fri to Sat due to esophogeal stricture. She's been stretched twice this year, but still having food get caught. She did not have a swallow study, but one was recommended. Would you be willing to order that? I'm sure she'll need SLP and/or dietary to follow up with her after that. She's really bummed about the need to have softer foods and no coffee.               Follow-ups after your visit        Follow-up notes from your care team     Return in about 6 months (around 1/26/2018).      Who to contact     Please call your clinic at [phone] to:    Ask questions about your health    Make or cancel appointments    Discuss your medicines    Learn about your test results    Speak to your doctor   If you have compliments or concerns about an experience at your clinic, or if you wish to file a complaint, please contact [facility] Patient Relations at [phone] or  email us at [email or web address]         Additional Information About Your Visit        [employer] Information     [employer] is an electronic gateway that provides easy, online access to your medical records. With [employer], you can request a clinic appointment, read your test results, renew a prescription or communicate with your care team.     To sign up for [employer] visit the website at [email or web address]   You will be asked to enter the access code listed below, as well as some personal information. Please follow the directions to create your username and password.     Your access code is: [email or web address]  Expires: 10/10/2017  6:31 AM     Your access code will  in 90 days. If you need help or a new code, please contact your [facility] or call [phone] for assistance.        Care EveryWhere ID     This is your Care EveryWhere ID. This could be used by other organizations to access your [facility] medical records  [id number]        Your Vitals Were     Pulse Temperature Respirations Height Pulse Oximetry Breastfeeding?    74 97.1  F (36.2  C) (Oral) 24 1.524 m (5') 98% No       Blood Pressure from Last 3 Encounters:   17 128/80   17 179/73   16 155/77    Weight from Last 3 Encounters:   16 64.4 kg (142 lb)   03/10/14 62.6 kg (138 lb)   12 61.2 kg (135 lb)              Today, you had the following     No orders found for display       Primary Care Provider Office Phone # Fax #    Marcus Santana [phone]       [facility] [address]  [facility] [address]        Equal Access to Services     [facility] : Hadii sal Delong, wamarcieda luasif, qaalivia kaalmada cassius, doris hoskins. So [facility] [phone].    ATENCIÓN: Si habla español, tiene a thompson disposición servicios gratuitos de asistencia lingüística. Llame al [phone].    We comply with applicable federal  civil rights laws and [place] laws. We do not discriminate on the basis of race, color, national origin, age, disability sex, sexual orientation or gender identity.            Thank you!     Thank you for choosing [facility] PHYSICAL MEDICINE AND REHABILITATION  for your care. Our goal is always to provide you with excellent care. Hearing back from our patients is one way we can continue to improve our services. Please take a few minutes to complete the written survey that you may receive in the mail after your visit with us. Thank you!             Your Updated Medication List - Protect others around you: Learn how to safely use, store and throw away your medicines at www.disposemymeds.org.          This list is accurate as of: 7/26/17 11:39 AM.  Always use your most recent med list.                   Brand Name Dispense Instructions for use Diagnosis    ACETAMIN 500 MG tablet   Generic drug:  acetaminophen      Take 1-2 tablets by mouth every 6 hours as needed.        albuterol 90 MCG/ACT inhaler      Inhale 2 puffs into the lungs every 6 hours as needed.        baclofen 10 MG tablet    LIORESAL    180 tablet    Take 1-2 tablets (10-20 mg) by mouth 3 times daily    Muscle spasm       BUPROPION HCL PO      Take 150 mg by mouth daily        CALTRATE 600+D PLUS PO      Take 2 tablets by mouth daily.        captopril 50 MG tablet    CAPOTEN     Take  by mouth. 2 tablets in am and 1 tablet in pm        CENTRUM SILVER per tablet      Take 1 tablet by mouth daily        EVISTA 60 MG tablet   Generic drug:  raloxifene      Take 60 mg by mouth daily.        fluticasone 100 MCG/BLIST Aepb    FLOVENT DISKUS     Inhale 2 puffs into the lungs every 12 hours        gabapentin 8 % Gel topical PLO cream     60 g    Apply 2-4 g topically 2 times daily as needed To bilateral feet    Neuropathic pain       HYDROcodone-acetaminophen 5-325 MG per tablet    NORCO    30 tablet    Take 1 tablet by mouth every 4 hours as needed.     Post-operative state       priLOSEC 20 MG CR capsule   Generic drug:  omeprazole      Take 20 mg by mouth 2 times daily.        QVAR 40 MCG/ACT Inhaler   Generic drug:  beclomethasone      Inhale 2 puffs into the lungs 2 times daily as needed.        triamterene-hydrochlorothiazide 37.5-25 MG per tablet    MAXZIDE-25     Take 1 tablet by mouth daily.        ZOLOFT 100 MG tablet   Generic drug:  sertraline      Take 100 mg by mouth daily.

## 2017-07-26 NOTE — PROGRESS NOTES
[facility]      OUTPATIENT PHYSICAL THERAPY  PLAN OF TREATMENT FOR OUTPATIENT REHABILITATION    Patient's Last Name, First Name, M.I.                YOB: 1945  Benji Shankar                        Provider's Name  [facility] Medical Record No.  [id number]                               Onset Date: 3/24/17   Start of Care Date: 4/11/17   Type:     _X_PT   ___OT   ___SLP Medical Diagnosis: Spastic diplegic cerebral palsy                        PT Diagnosis: deconditioning, impaired transfers and bed mobility.      _________________________________________________________________________________  Plan of Treatment:    Frequency/Duration: 1x home eval     Goals:  Goal Identifier Transfers   Goal Description Pt demo ability to transfer with weekend staff consistently via use of improved techniques and/or equipment.   Target Date 07/09/17   Date Met  07/24/17   Progress: Pt with successful home eval with Cesilia Fajardo. Will order through [facility] once order is attained. Additionally, pt demo ability to complete mock car transfer via slide board in order to get out more with PCA.      Goal Identifier Comfort in power w/c   Goal Description Pt demo improved positioning in power w/c as result of modifications made via collaboration with vendor, and improved trunk/LE ROM.   Target Date 07/09/17   Date Met  06/20/17   Progress: Pt has new Matrix seat cushion on order.      Goal Identifier HEP   Goal Description Pt demo indep with (including possible direction of PCAs) HEP for LE/trunk/UE ROM and strength for ongoing wellness.   Target Date 07/09/17   Date Met  06/20/17   Progress: HEP reviewed with PCA and updated to meet pt's current needs.          Certification date from 7/9/17 to 7/30/17.    Kimberly Stein, PT          I CERTIFY THE NEED FOR THESE SERVICES FURNISHED UNDER         THIS PLAN OF TREATMENT AND WHILE UNDER MY CARE     (Physician co-signature of this document indicates review and certification of the therapy plan).                  Referring Provider: Dr Domingo Posey

## 2017-07-26 NOTE — PATIENT INSTRUCTIONS
Copy from recent recommendations for Dr. Pablo's reference and hope he has specific thoughts given his knowledge with your history. Defer to his insights on swallow study and if additional work up is needed. SLP referral etc.  See EGD was done at [facility]. Also copy results below.    Call Dr. Pablo to discuss with him or clinic nurse the recent studies at [facility] and if there is need for additional procedure on 7/29 and where to go from here.    We are happy to do SLP for any swallow and / or therapy at the [facility] where you're seeing PT Laurie. I'm happy to order if that's what Dr. Pablo wants or he can order for there or his system.

## 2017-07-26 NOTE — NURSING NOTE
"Chief Complaint   Patient presents with     RECHECK     UMP- CEREBRAL PALSY, 3 MONTHS F/U     Cheo Velásquez CMA        PATIENT WENT TO THE HOSPITAL A WEEK BECAUSE SHE WAS HAVING \"SWALLOWING PROBLEMS\". SHE STAYED AT THE HOSPITAL UNTIL Saturday.  "

## 2017-07-31 NOTE — PROGRESS NOTES
Benji, who goes by Ahsan, is a pleasant now 72-year-old woman with long-standing spastic diplegic cerebral palsy who I last saw March 2017. She returns today with several concerns of worsening function and swallow.   She had been hospitalized at [facility] with swallow difficulties. She has some difficulty articulating the specifics on what all had been done and recommended. She signed release of information forms for Care Everywhere and I reviewed some of the recent notes from there during her visit. Looks like there were few EGD's completed, I copied results below for EMR reference and reviewed with her. It seems there were no dysphagia changes to her diet recommended but encouraged her to chew more thoroughly and take small bites. She's scheduled for f/u with her gastroenterologist for this.    Additional concern with some slow changes over time and increasing difficulty with transfers. She has PCA support and the primary person Sofiya is a long time provider and has become friend. Sofiya is able to do stand pivot assist transfers though other providers use a sling. Ahsan had a recent home visit by physical therapist Laurie Stein. They had a trial for a Cesilia Steady which was very effective and we're pursuing a letter of necessity for this DME. Ahsan also reports some questions have been raised with her AFO's and some more purple color on her skin. This isn't painful and no skin breakdown. Her PCA monitors skin close.    Ahsan has moderate amount of tearfulness and struggles with aging with disability and slow loss of function. She is on antidepressant but hasn't done any counseling.      PHYSICAL EXAMINATION:    /80 (BP Location: Right arm, Patient Position: Sitting, Cuff Size: Adult Large)  Pulse 74  Temp 97.1  F (36.2  C) (Oral)  Resp 24  Ht 5' (1.524 m)  Wt 151 lb (68.5 kg)  SpO2 98%  Breastfeeding? No  BMI 29.49 kg/m2   Ahsan is alert, mostly pleasant but also tearful at times. She  has fluent speech and language. Arrives with her power wheelchair which has power tilt and independent foot/leg elevation, left side joystick control, flip up foot plate.  Her feet are Velcro strapped down. Bilateral solid ankle AFO in leather shoes. She has bilateral tibial torsion with external rotation which is longstanding for her.  I removed these today, there is some more broad distribution purple blanchable skin but no pressure areas noted. She has pes planus as well as metatarsal lateral deviation slightly. AFO's are older but appearing in decent shape and fitting OK. Velcro has been replaced.     ASSESSMENT AND RECOMMENDATIONS:     1. Overall suspect no new neurologic issues but rather some aging in person with static CP.  2. Order for Cesilia Fajardo to assist with transfers. Has already had home visit with this and it's effective.  3. Swallow f/u with her established gastroenterologist Dr. Pablo for specific follow up recommendations though some SLP seems appropriate. This could be at the [facility]   4. AFO's seem to be functioning OK, no change.  5. No changes to her spasticity management, continues baclofen.  6. Discussed aging with disability and adjustment. Do feel psychology counseling would be good but she's not open to that idea. Continues on antidepressant.  7. Follow up with Benji in 6 months.  She will contact us sooner if need arises.      50 minutes spent in direct patient interaction, greater than 50% counseling and education of above detailed items.      Domingo Posey MD       EGD 7/21:  Findings:       Localized moderate mucosal changes characterized by smoothness and        altered texture were found in the lower third of the esophagus. Biopsies        were taken with a cold forceps for histology. A TTS dilator was passed        through the scope. Dilation with an 18-19-20 mm balloon (to a maximum        balloon size of 20 mm) dilator was performed.       -No hiatal hernia  noted.       Abnormal motility was noted in the esophagus. The cricopharyngeus was        normal. There are extra peristaltic waves of the esophageal body.        Tertiary peristaltic waves are noted.       The entire examined stomach was normal.       The 2nd part of the duodenum was normal.  Impression:       - Smooth, texture changed mucosa in the esophagus. Biopsied. Dilated to        20mm empirically as patient has had relief in symptoms with this        procedure. Suspect symptoms are more related to underlying age related        dysmotility, without obvious stricture or stenosis noted on exam.       - No hiatal hernia noted.       - The examination was suspicious for presbyesophagus.       - Normal stomach.       - Normal 2nd part of the duodenum.           EGD 5/22/2017   Findings:       Abnormal motility was noted in the esophagus. Tertiary peristaltic waves        are noted. Prior biopsies were unrevealing for eosiniphilic esohagitis.        A guidewire was placed and the scope was withdrawn. Dilation was        performed with a Savary dilator with no resistance at 18 mm. The        endoscope was reinserted and no laceratiions/tears/ active bleeding        noted.       A 5 cm hiatus hernia was present.       The entire examined stomach was normal.       The examined duodenum was normal.       Patchy ? candidiasis vs. food debris was found in the upper third of the        esophagus. Brushings were taken with a cold for analysis.  Impression:          - The examination was suspicious for presbyesophagus.                        Dilated.                       - 5 cm hiatus hernia.                       - Normal stomach.                       - Normal examined duodenum.                       - No specimens collected.

## 2018-01-24 ENCOUNTER — OFFICE VISIT (OUTPATIENT)
Dept: PHYSICAL MEDICINE AND REHAB | Facility: CLINIC | Age: 73
End: 2018-01-24
Payer: COMMERCIAL

## 2018-01-24 ENCOUNTER — TELEPHONE (OUTPATIENT)
Dept: PHYSICAL MEDICINE AND REHAB | Facility: CLINIC | Age: 73
End: 2018-01-24

## 2018-01-24 VITALS
HEIGHT: 60 IN | OXYGEN SATURATION: 98 % | SYSTOLIC BLOOD PRESSURE: 144 MMHG | DIASTOLIC BLOOD PRESSURE: 83 MMHG | HEART RATE: 73 BPM

## 2018-01-24 DIAGNOSIS — R53.83 FATIGUE, UNSPECIFIED TYPE: Primary | ICD-10-CM

## 2018-01-24 DIAGNOSIS — G80.1 SPASTIC DIPLEGIC CEREBRAL PALSY (H): ICD-10-CM

## 2018-01-24 DIAGNOSIS — M79.10 MYALGIA: ICD-10-CM

## 2018-01-24 DIAGNOSIS — R53.83 FATIGUE, UNSPECIFIED TYPE: ICD-10-CM

## 2018-01-24 LAB
ALBUMIN SERPL-MCNC: 4.2 G/DL (ref 3.4–5)
ALP SERPL-CCNC: 73 U/L (ref 40–150)
ALT SERPL W P-5'-P-CCNC: 22 U/L (ref 0–50)
ANION GAP SERPL CALCULATED.3IONS-SCNC: 7 MMOL/L (ref 3–14)
AST SERPL W P-5'-P-CCNC: 14 U/L (ref 0–45)
BILIRUB SERPL-MCNC: 0.3 MG/DL (ref 0.2–1.3)
BUN SERPL-MCNC: 26 MG/DL (ref 7–30)
CALCIUM SERPL-MCNC: 9.3 MG/DL (ref 8.5–10.1)
CHLORIDE SERPL-SCNC: 104 MMOL/L (ref 94–109)
CO2 SERPL-SCNC: 27 MMOL/L (ref 20–32)
CREAT SERPL-MCNC: 0.67 MG/DL (ref 0.52–1.04)
CRP SERPL-MCNC: <2.9 MG/L (ref 0–8)
ERYTHROCYTE [DISTWIDTH] IN BLOOD BY AUTOMATED COUNT: 12.6 % (ref 10–15)
ERYTHROCYTE [SEDIMENTATION RATE] IN BLOOD BY WESTERGREN METHOD: 7 MM/H (ref 0–30)
GFR SERPL CREATININE-BSD FRML MDRD: 86 ML/MIN/1.7M2
GLUCOSE SERPL-MCNC: 85 MG/DL (ref 70–99)
HCT VFR BLD AUTO: 44 % (ref 35–47)
HGB BLD-MCNC: 14.8 G/DL (ref 11.7–15.7)
MCH RBC QN AUTO: 30.2 PG (ref 26.5–33)
MCHC RBC AUTO-ENTMCNC: 33.6 G/DL (ref 31.5–36.5)
MCV RBC AUTO: 90 FL (ref 78–100)
PLATELET # BLD AUTO: 297 10E9/L (ref 150–450)
POTASSIUM SERPL-SCNC: 3.2 MMOL/L (ref 3.4–5.3)
PROT SERPL-MCNC: 7.7 G/DL (ref 6.8–8.8)
RBC # BLD AUTO: 4.9 10E12/L (ref 3.8–5.2)
SODIUM SERPL-SCNC: 138 MMOL/L (ref 133–144)
WBC # BLD AUTO: 7.2 10E9/L (ref 4–11)

## 2018-01-24 ASSESSMENT — PAIN SCALES - GENERAL: PAINLEVEL: EXTREME PAIN (9)

## 2018-01-24 NOTE — PROGRESS NOTES
"Physical medicine rotation clinic:    Ahsan is a 73-year-old woman with spastic diplegic cerebral palsy who I had last seen about 6 months ago.  She returns on a routine basis.  Since last visit she has had ongoing to slightly worsening aches and pains.  She describes this as \"all over\" affecting muscles joints and a broader distribution.  She blames this on arthritis.  Not necessarily worse at different times of the day or with activity.  She believes there is muscle spasticity for sure and she does take baclofen.  The prescription list 10-20 mg 3 times daily though she is pretty sure she only takes it twice daily.  She is not sure whether it is tender 20 mg.  Her personal care attendant does help set up her pillbox.  In reviewing her list of meds, I also see Norco listed and she is not sure whether she takes that it.  She is familiar of taking acetaminophen what seems like 1 extra strength Tylenol in the morning, 2 at bedtime and occasionally 1 midday.  She has not been taking ibuprofen or Naprosyn recently.    Ahsan uses power wheelchair.  Her usual chair which has tilt capacity is currently at the vendor getting repairs.  She is using an older power chair that does not have a separate seat cushion and this is definitely less comfortable.  It does not have tilt capacity and she is dependent on assistance for repositioning and transfers.    Ahsan's PCA support comes in the morning and again in the evening.  She will often empty her bladder only twice per day which is long-standing for her.  She denies urinary tract infections or other symptomatology of bladder retention.    Ahsan does have history of esophageal strictures and gets periodic stretching.  She follows with Dr. Say Pablo for this and feels she is over due currently.  She can have some difficulty with larger pills and crushes some of them when able.    Physical exam:  /83 (BP Location: Left arm, Patient Position: Sitting, Cuff Size: Adult " Regular)  Pulse 73  Ht 5' (1.524 m)  SpO2 98%   Alert pleasant, fair to good historian.  Fluent speech  Breathing easy no cough or wheeze  Arrives with power wheelchair, definitely older with captains chair type seat without separate wheelchair cushion.  Slipped down foot rest and joystick can control.  There is definitely some tenderness to palpation through bilateral calves though not necessarily worsened with stretching, ankle dorsiflexion.  There is some but not quite as much tenderness in the more broad muscle distribution through quadriceps, hamstrings and upper extremities.  No joint line tenderness through the ankles knees wrists or elbows.  Some restricted end range shoulder range of motion as well as slight knee flexion contractures but otherwise good range of motion at the elbows wrists fingers and ankles.    Assessment and recommendations:  1. She has diffuse myalgia/arthralgia and soreness.  Etiology is not clear but likely multifactorial.  She definitely has spasticity in the lower extremities from her cerebral palsy but some of the broader discomfort patterns does not match that alone.  Given the broader distribution, would be important to rule out other systemic issues such as poly-myalgia rheumatica.  I sent ESR and CRP tests today and both came back normal lower range which makes PMR unlikely.  2. She is using acetaminophen which is good first-line.  She could potentially increase the dose a bit to 1000 mg 3 or up to 4 times daily.  3. Consideration using nonsteroidal anti-inflammatory.  Naprosyn 250 mg twice daily may get some good duration of benefit.  Educated on monitoring for any esophageal or stomach upset especially giving her esophageal stricture would not want pillows to get stuck part way.  She is already on PPI.  4. She continues with stretching/range of motion program with her PCA support.  5. Would like her to be seen in formal wheelchair seating assessment to see if the current  wheelchair cushion and set up is optimum for her.  This is with occupational therapist at the [facility] location  6. She completed some outpatient physical therapy recently, no other new therapy order recommendations.  7. Follow-up in about 6 months time, she will contact us sooner if any functional or rehab issues arise.    40 minutes spent in direct patient interaction, greater than 50% counseling and education on the above detailed items.

## 2018-01-24 NOTE — PATIENT INSTRUCTIONS
Some labs today to see if there is other explanations for your sore muscles and joints    Agree with acetaminophen for pain as first line.    Consider taking naprosyn 250 mg up to twice daily to see if this helps the pains further. Caution with any upset stomach or esophagus as possible side effect of this medication can be ulcers especially if taken for longer term.  OK to crush this medication.    We'll call you with lab results.

## 2018-01-24 NOTE — MR AVS SNAPSHOT
After Visit Summary   1/24/2018    Benji Shankar    MRN: [id number]           Patient Information     Date Of Birth          1945        Visit Information        Provider Department      1/24/2018 10:00 AM Domingo Posey MD [facility] Physical Medicine and Rehabilitation        Today's Diagnoses     Fatigue, unspecified type    -  1    Myalgia        Spastic diplegic cerebral palsy (H)          Care Instructions    Some labs today to see if there is other explanations for your sore muscles and joints    Agree with acetaminophen for pain as first line.    Consider taking naprosyn 250 mg up to twice daily to see if this helps the pains further. Caution with any upset stomach or esophagus as possible side effect of this medication can be ulcers especially if taken for longer term.  OK to crush this medication.    We'll call you with lab results.                Follow-ups after your visit        Additional Services     OCCUPATIONAL THERAPY REFERRAL       *This therapy referral will be filtered to a centralized scheduling office at [facility] and the patient will receive a call to schedule an appointment at a [facility] location most convenient for them. *     [facility] provides Occupational Therapy evaluation and treatment and many specialty services across the [facility] system.  If requesting a specialty program, please choose from the list below.    If you have not heard from the scheduling office within 2 business days, please call [phone] for all locations, with the exception of [place], please call [phone].     Treatment: Evaluation & Treatment  Special Instructions/Modalities: seating clinic with Renetta [employer] [place] location. Having some increased soreness on buttocks. Has temporary chair while one in shop. Might need alternate chair.    W/C Vendor Dominic    Please be aware that coverage of these services is subject to the terms and  limitations of your health insurance plan.  Call member services at your health plan with any benefit or coverage questions.                  Your next 10 appointments already scheduled     2018 11:00 AM CDT   (Arrive by 10:45 AM)   Return Visit with Domingo Posey MD   [facility] Physical Medicine and Rehabilitation ([facility] [place])    [address]  [address] [facility] [address] [phone]              Future tests that were ordered for you today     Open Future Orders        Priority Expected Expires Ordered    CRP inflammation Routine  2019    Erythrocyte sedimentation rate auto Routine  2019    CBC with platelets Routine  2019    Comprehensive metabolic panel Routine  2019            Who to contact     Please call your clinic at [phone] to:    Ask questions about your health    Make or cancel appointments    Discuss your medicines    Learn about your test results    Speak to your doctor   If you have compliments or concerns about an experience at your clinic, or if you wish to file a complaint, please contact [facility] Patient Relations at [phone] or email us at [email or web address]         Additional Information About Your Visit        MOGLhart Information     Fusion Dynamict is an electronic gateway that provides easy, online access to your medical records. With Anyvite, you can request a clinic appointment, read your test results, renew a prescription or communicate with your care team.     To sign up for Fusion Dynamict visit the website at [email or web address]   You will be asked to enter the access code listed below, as well as some personal information. Please follow the directions to create your username and password.     Your access code is: [email or web address]  Expires: 4/10/2018  6:30 AM     Your access code will  in 90 days. If you need help or a new code,  please contact your [facility] or call [phone] for assistance.        Care EveryWhere ID     This is your Care EveryWhere ID. This could be used by other organizations to access your [facility] medical records  [id number]        Your Vitals Were     Pulse Height Pulse Oximetry             73 1.524 m (5') 98%          Blood Pressure from Last 3 Encounters:   01/24/18 144/83   07/26/17 128/80   03/22/17 179/73    Weight from Last 3 Encounters:   07/26/17 68.5 kg (151 lb)   03/23/16 64.4 kg (142 lb)   03/10/14 62.6 kg (138 lb)              We Performed the Following     OCCUPATIONAL THERAPY REFERRAL        Primary Care Provider Office Phone # Fax #    Marcus Santana [phone]       [facility] [address]  [facility] [address]        Equal Access to Services     [facility]: Hadii aad ku hadasho Soomaali, waaxda luqadaha, qaybta kaalmada adeegyada, waxay idiin hayaan adeeg kharash la'aan . So [facility] [phone].    ATENCIÓN: Si habla español, tiene a thompson disposición servicios gratuitos de asistencia lingüística. [place] al [phone].    We comply with applicable federal civil rights laws and [place] laws. We do not discriminate on the basis of race, color, national origin, age, disability, sex, sexual orientation, or gender identity.            Thank you!     Thank you for choosing [facility] PHYSICAL MEDICINE AND REHABILITATION  for your care. Our goal is always to provide you with excellent care. Hearing back from our patients is one way we can continue to improve our services. Please take a few minutes to complete the written survey that you may receive in the mail after your visit with us. Thank you!             Your Updated Medication List - Protect others around you: Learn how to safely use, store and throw away your medicines at www.disposemymeds.org.          This list is accurate as of 1/24/18 10:58 AM.  Always use your most recent med list.                    Brand Name Dispense Instructions for use Diagnosis    ACETAMIN 500 MG tablet   Generic drug:  acetaminophen      Take 1-2 tablets by mouth every 6 hours as needed.        albuterol 90 MCG/ACT inhaler      Inhale 2 puffs into the lungs every 6 hours as needed.        baclofen 10 MG tablet    LIORESAL    180 tablet    Take 1-2 tablets (10-20 mg) by mouth 3 times daily    Muscle spasm       BUPROPION HCL PO      Take 150 mg by mouth daily        CALTRATE 600+D PLUS PO      Take 2 tablets by mouth daily.        captopril 50 MG tablet    CAPOTEN     Take  by mouth. 2 tablets in am and 1 tablet in pm        CENTRUM SILVER per tablet      Take 1 tablet by mouth daily        fluticasone 100 MCG/BLIST Aepb    FLOVENT DISKUS     Inhale 2 puffs into the lungs every 12 hours        gabapentin 8 % Gel topical PLO cream     60 g    Apply 2-4 g topically 2 times daily as needed To bilateral feet    Neuropathic pain       HYDROcodone-acetaminophen 5-325 MG per tablet    NORCO    30 tablet    Take 1 tablet by mouth every 4 hours as needed.    Post-operative state       priLOSEC 20 MG CR capsule   Generic drug:  omeprazole      Take 20 mg by mouth 2 times daily.        QVAR 40 MCG/ACT Inhaler   Generic drug:  beclomethasone      Inhale 2 puffs into the lungs 2 times daily as needed.        triamterene-hydrochlorothiazide 37.5-25 MG per tablet    MAXZIDE-25     Take 1 tablet by mouth daily.        ZOLOFT 100 MG tablet   Generic drug:  sertraline      Take 100 mg by mouth daily.

## 2018-01-24 NOTE — TELEPHONE ENCOUNTER
Patient notified of lab results ordered by Dr Posey. Tests were normal, other than the Potassium was slightly lower. Encouraged to eat bananas, potatoes or pickles.

## 2018-02-26 ENCOUNTER — HOSPITAL ENCOUNTER (OUTPATIENT)
Dept: OCCUPATIONAL THERAPY | Facility: CLINIC | Age: 73
Setting detail: THERAPIES SERIES
End: 2018-02-26
Attending: PHYSICAL MEDICINE & REHABILITATION
Payer: COMMERCIAL

## 2018-02-26 PROCEDURE — 97542 WHEELCHAIR MNGMENT TRAINING: CPT | Mod: GO | Performed by: OCCUPATIONAL THERAPIST

## 2018-02-26 PROCEDURE — 40000132 ZZH STATISTIC OT SEATING/WHEELED MOBILITY VISIT: Performed by: OCCUPATIONAL THERAPIST

## 2018-02-26 NOTE — PROGRESS NOTES
SEATING AND WHEELED MOBILITY ASSESSMENT  02/26/18 1100   Quick Adds   Quick Adds Current Power Wheelchair   General Information    Rehab Discipline OT   Funding [employer]   Service Outpatient;Occupational Therapy;Seating/Wheeled Mobility Evaluation   Height 5'   Weight 135   Start Of Care Date 02/26/18   Referring Physician Domingo Posey   Orders Evaluate And Treat As Indicated;Per Therapist Evaluation   Orders Date 01/24/18   Patient/Caregiver Goals be more comfortable   Rehabilitation Technology Supplier Renetta Gómez, JANIS from [facility] presentAvel reports working with [facility] Support Personal Care Attendant  (AM/PM cares 2-3 hrs at a time)   Patient role/Employment history Disabled;Retired   General Information Comments alone 19hr/day   Fall Risk Screen   Fall screen completed by OT   Have you fallen 2 or more times in the past year? No   Have you fallen and had an injury in the past year? No   Is patient a fall risk? No   Medical History   Onset Of Illness/injury Or Date Of Surgery 1/24/18   Medical Diagnosis CP, bronchitis   Cardio-Respiratory Status Inhaler   Recent or Planned Surgeries 2012 breast reduction   Comments has esophagus dialated yearly   Current Power Wheelchair   Age 3 years    J6  (flovai)   Cushion Gel  (Flexenclosure)   Back Solid Curved   Footrest Style PELRs   Headrest Yes   Settings Used Home;Outdoor Community Mobility;Primary Means of Transportation   Condition Good   Positioning Features Tilt Seat;Power Elevating Leg Rests   Power Control Left Joystick   Current Equipment Requires Update   Rational for Equipment Changes changes to cushion   Home Accessibility   Living Environment Apartment/condo   Primary Entrance Elevator   All Rooms Wheelchair Accessible Yes   Community ADL   Transportation Transportation Services   Community Mobility Requirements Medical Appointments;Shopping;Religion   Cognitive/Visual/Hearing Status   Observations No Problems  Observed During Evaluation   Vision Corrective Lenses   Hearing Hearing Aide(s)   ADL Status   Feeding Independent   Grooming/Hygiene Independent   Dressing Requires Assist   Fatigue   Reported Problems spontaneous naps   Balance   Unsupported Sitting Balance Within Functional Limits   Sitting Balance in Chair Uses Upper Extremities for Balance   Standing Balance Uses Upper Extremities for Balance;Physical Assist Required   Ambulation   Ambulation Non Ambulatory   Transfers   Transfer Assist Moderate Assist   Transfer Method Stand Pivot   Sleep/Rest   Sleep Surface/Equipment sleep number adjustable   Wheelchair Ability   Wheelchair Ability Quick Adds Power Chair   Power Wheelchair Propulsion   Operate Power Wheelchair Standard Joystick   Wheelchair Use   Ability to Perform Weight Shifts Unable   Bed Confined Without Wheelchair? Yes   Hours in Wheelchair Daily 10   Hours Spent Alone Daily 19   Neuromuscular   History of Pressure Sores No   Current Pressure Sores No   Additional Pressure Sores? No   Pain Yes   Pain Location buttocks   Coordination UE Impaired;LE Impaired   Tone Spasticity   Sensory Deficits Reported none   Sensation on Seating Surface Intact per Report   Head and Neck   Head and Neck Position Functional   Head Control Good   Upper Extremities   Shoulder Position Functional Bilaterally   UE ROM full   Pelvis   Anterior/Posterior Pelvis Position Posterior Tilt   Trunk   Anterior/Posterior Trunk Position Increased Thoracic Kyphosis   Lower Extremities   Hip Position Abducted  (externally rotated)   LE ROM tight hamstrings and hips in external rotation   LE Strength 1-2-/5   Education Assessment   Barriers to Learning No Barriers   Preferred Learning Style Listening;Demonstration   Assessment/Plan   Criteria for Skilled Interventions Met Yes, Treatment Indicated   Treatment Diagnosis impaired mobility   Influenced by the Following Impairments spasticity and weakness   Functional Limitations Due to  Impairments non ambulatory   Therapy Frequency up to 4 visits    Predicted Duration of Therapy Intervention in 90days   Planned Therapy Interventions Wheelchair Management/Propulsion Training   Planned Therapy Interventions Comments Training in use of power seat functions for pressure relief and comfort.  Pressure mapping to determine any pressure areas.  No red/high pressure areas noted.  Trialed M2 cushion.  Too high for her.  Trainined in cushion wear and tear and goals.   Risks and benefits of treatment have been explained Yes   Patient/family & other staff in agreement with plan of care Yes   Session Time   Total Treatment Time 75   Total Session Time 75   Goal 1   Goal Identifier cushion   Goal Description Patient to demonstrate a successful clinical trial of the recommended wheelchair   Target Date 05/27/18   Electronically signed by:  Renetta PEDRO/INDIRA, ATP      Occupational Therapist, Assistive   [phone]      fax: [phone]      [email or web address]  Seating Clinic- [facility], [address]  [address]  [place] [address]

## 2018-05-30 NOTE — PROGRESS NOTES
Outpatient Occupational Therapy Discharge Note     Patient: Benji Shankar  : 1945    Beginning/End Dates of Reporting Period:  18    Referring Provider: Domingo Onofre Diagnosis: impaired mobility    Goals:     Goal Identifier cushion   Goal Description Patient to demonstrate a successful clinical trial of the recommended wheelchair   Target Date 18   Date Met      Progress: not met   Progress Toward Goals:   Progress limited due to f/u by patient.  Called to cancel f/u next day.    Plan:  Discharge from therapy.    Discharge:    Reason for Discharge: Patient chooses to discontinue therapy.  Medicare G-code: Patient did not attend a final scheduled session prior to discharge. Unable to determine discharge functional status.      Discharge Plan: Use seat functions for pressure relief as well as proper use of cushion.  Electronically signed by:  Renetta PEDRO/INDIRA, ATP      Occupational Therapist, Assistive   [phone]      fax: [phone]      [email or web address]  Seating Clinic- [facility], [address]  [place] [address]

## 2018-07-11 ENCOUNTER — OFFICE VISIT (OUTPATIENT)
Dept: PHYSICAL MEDICINE AND REHAB | Facility: CLINIC | Age: 73
End: 2018-07-11
Payer: COMMERCIAL

## 2018-07-11 VITALS — DIASTOLIC BLOOD PRESSURE: 71 MMHG | HEART RATE: 77 BPM | HEIGHT: 60 IN | SYSTOLIC BLOOD PRESSURE: 160 MMHG

## 2018-07-11 DIAGNOSIS — M62.838 MUSCLE SPASM: ICD-10-CM

## 2018-07-11 DIAGNOSIS — G80.1 SPASTIC DIPLEGIC CEREBRAL PALSY (H): Primary | ICD-10-CM

## 2018-07-11 RX ORDER — BACLOFEN 10 MG/1
20-30 TABLET ORAL 3 TIMES DAILY
Qty: 810 TABLET | Refills: 3 | Status: SHIPPED | OUTPATIENT
Start: 2018-07-11

## 2018-07-11 ASSESSMENT — PAIN SCALES - GENERAL: PAINLEVEL: SEVERE PAIN (6)

## 2018-07-11 NOTE — LETTER
"7/11/2018       RE: Benji Shankar  [address]  [facility] [address]     Dear Colleague,    Thank you for referring your patient, Benji Shankar, to the [facility] PHYSICAL MEDICINE AND REHABILITATION at [facility]. Please see a copy of my visit note below.    Physical medicine rotation clinic:    Ahsan is a 73-year-old woman with spastic diplegic cerebral palsy who I had last seen about 6 months ago.  She returns on a routine basis.  Since last visit she has had esophageal dilation.  She reports difficulty swallowing pills but in general not food.  She feels that helped some but does not last all that long.  Procedure itself is uncomfortable so she is hesitant to repeat it too often.    At last visit she was describing some more generalized aches and pains and we did do some laboratory workup which did not identify any particular issues.  She reports today that things overall are fairly stable.  She does continue to have some soreness in broader distribution at different times but nothing new or different.    One concern she is reporting some increased difficulty with car transfers.  She tends to have sounds like extension spasticity, her caregiver describes it as \"a washboard\". She does not seem to have as much of the stiffness without a transfers such as to commode or to her chair. She has baclofen Rx says 10-20 mg TID and at first sayng she takes just 20 at bedtime but then corrects herself saying takes the 20 mg TID.    She does have a power wheelchair which she uses most of the time.  His assistance in the morning and evening from a personal care attendant.  She is then up throughout the day in her power chair.  She also has a manual wheelchair but is used only for situations when the power chair is out of commission or if she is traveling somewhere with her PCA the car when a lift van is not available.  Her current home wheelchair is " showing signs of wear and tear.  At her last visit in January he was describing some uncomfortable issues and I did send her to formal wheelchair seating assessment though Ahsan does not recall any specifics from that visit.  She reports there is a wheelchair expert by the name of Dominic she has worked with in the past but she is not sure with what group or vendor he is with.  He had apparently talked with her a while ago and recommended she might be available to get a new power wheelchair as early as this October.    Physical exam:  /71  Pulse 77  Ht 5' (1.524 m)   Alert pleasant, fair to good historian.  Fluent speech  Arrives with power wheelchair that is showing signs of of wear and tear.   Has some longer standing slight knee flexion contractures but otherwise good range of motion at the elbows wrists fingers and ankles.  Tone 2/4 in bilateral legs but no involuntary spasms seen.  Skin changes with hyperpigmentation in bilateral LE's below about mid calf.    Assessment and recommendations:  1. We will send formal physical therapy referral with some folks exploration on car transfers and spasticity management but also transfers in general and maintenance exercise program.  2. She has baclofen currently for spasticity and we can explore slightly higher dose.  Currently 20 mg 3 times daily she can try going up to 30 mg 3 times daily.  Prescription sent to her pharmacy.  Stressed importance of maintenance exercise plan.  3. Follow for also being sent for wheelchair seating assessment, occupational therapist at the [facility].  Today will count as her formal face-to-face evaluation.  4. Follow-up in about a year. She will contact us sooner if any functional or rehab issues arise.    40 minutes spent in direct patient interaction, greater than 50% counseling and education on the above detailed items.    Again, thank you for allowing me to participate in the care of your patient.       Sincerely,    Domingo Posey MD

## 2018-07-11 NOTE — MR AVS SNAPSHOT
After Visit Summary   7/11/2018    Benji Shankar    MRN: [id number]           Patient Information     Date Of Birth          1945        Visit Information        Provider Department      7/11/2018 11:00 AM Domingo Posey MD [facility] Physical Medicine and Rehabilitation        Today's Diagnoses     Spastic diplegic cerebral palsy (H)    -  1    Muscle spasm          Care Instructions    Trial increasing baclofen dose.  Currently using 20 mg (2×10 mg tablets) 3 times daily.  Can increase to 30 mg or 3 tablets up to 3 times daily. If you are sleepy as a side effect or the spasms are not any better, go back down to the 20 mg dosing.  Call Dr. Posey to get any questions about this.          Follow-ups after your visit        Additional Services     OCCUPATIONAL THERAPY REFERRAL       *This order will print in the [facility] Central Scheduling Office*    [facility] provides Physical Therapy evaluation and treatment and many specialty services across the [facility] system.  If requesting a specialty program, please choose from the list below.    Call one number to schedule at any [facility] location   [phone].    [facility] location.  Is seen Laurie Stein and Renetta Graham in the past  Treatment: Evaluation & Treatment  Special Instructions/Modalities: 73-year-old with spastic diplegic CP having some increased difficulties with car transfers and extensor tone pattern.  Physical therapy to evaluate these transfers, spasticity and function in general.  Occupational Therapy to work with her on exploring new power wheelchair.      Please be aware that coverage of these services is subject to the terms and limitations of your health insurance plan.  Call member services at your health plan with any benefit or coverage questions.            PHYSICAL THERAPY REFERRAL       *This order will print in the [facility] Central Scheduling Office*    [facility] provides Physical Therapy evaluation and treatment and many specialty services across the [facility].  If requesting a specialty program, please choose from the list below.    Call one number to schedule at any [facility] location   [phone].    [facility] location.  Is seen Laurie Stein and Renetta Graham in the past  Treatment: Evaluation & Treatment  Special Instructions/Modalities: 73-year-old with spastic diplegic CP having some increased difficulties with car transfers and extensor tone pattern.  Physical therapy to evaluate these transfers, spasticity and function in general.  Occupational Therapy to work with her on exploring new power wheelchair.      Please be aware that coverage of these services is subject to the terms and limitations of your health insurance plan.  Call member services at your health plan with any benefit or coverage questions.                  Follow-up notes from your care team     Return in about 1 year (around 7/11/2019).      Your next 10 appointments already scheduled     Jul 11, 2019 11:00 AM CDT   (Arrive by 10:45 AM)   Return Visit with Domingo Posey MD   [facility] Physical Medicine and Rehabilitation ([facility])    [address] [place] [address] [phone]              Who to contact     Please call your clinic at [phone] to:    Ask questions about your health    Make or cancel appointments    Discuss your medicines    Learn about your test results    Speak to your doctor            Additional Information About Your Visit        MyChart Information     Dragon Inside is an electronic gateway that provides easy, online access to your medical records. With Dragon Inside, you can request a clinic appointment, read your test results, renew a prescription or communicate with your care team.     To  sign up for [employer] visit the website at [email or web address]   You will be asked to enter the access code listed below, as well as some personal information. Please follow the directions to create your username and password.     Your access code is: [email or web address]  Expires: 2018  6:31 AM     Your access code will  in 90 days. If you need help or a new code, please contact your [facility] or call [phone] for assistance.        Care EveryWhere ID     This is your Care EveryWhere ID. This could be used by other organizations to access your [place] medical records  [id number]        Your Vitals Were     Pulse Height                77 1.524 m (5')           Blood Pressure from Last 3 Encounters:   18 160/71   18 144/83   17 128/80    Weight from Last 3 Encounters:   17 68.5 kg (151 lb)   16 64.4 kg (142 lb)   03/10/14 62.6 kg (138 lb)              We Performed the Following     MISCELLANEOUS DME SUPPLY OR ACCESSORY, NOT OTHERWISE SPECIFIED     OCCUPATIONAL THERAPY REFERRAL     PHYSICAL THERAPY REFERRAL          Today's Medication Changes          These changes are accurate as of 18 11:56 AM.  If you have any questions, ask your nurse or doctor.               These medicines have changed or have updated prescriptions.        Dose/Directions    baclofen 10 MG tablet   Commonly known as:  LIORESAL   This may have changed:  how much to take   Used for:  Muscle spasm   Changed by:  Domingo Posey MD        Dose:  20-30 mg   Take 2-3 tablets (20-30 mg) by mouth 3 times daily   Quantity:  810 tablet   Refills:  3            Where to get your medicines      These medications were sent to [facility] [address] [place] [address] & [address] [address]     Phone:  [phone]     baclofen 10 MG tablet                Primary Care Provider Office Phone # Fax #     Marcus Santana [phone]       [facility] [address]  [facility] [address]        Equal Access to Services     ALBANCARLOS MICHELLE : Hadnorah sal peter taisha Solaurie, waaxda luqadaha, qaybta kaalmada cassius, doris cruzchuckie gato. So [facility] [phone].    ATENCIÓN: Si habla español, tiene a thompson disposición servicios gratuitos de asistencia lingüística. Llame al [phone].    We comply with applicable federal civil rights laws and [place] laws. We do not discriminate on the basis of race, color, national origin, age, disability, sex, sexual orientation, or gender identity.            Thank you!     Thank you for choosing [facility] PHYSICAL MEDICINE AND REHABILITATION  for your care. Our goal is always to provide you with excellent care. Hearing back from our patients is one way we can continue to improve our services. Please take a few minutes to complete the written survey that you may receive in the mail after your visit with us. Thank you!             Your Updated Medication List - Protect others around you: Learn how to safely use, store and throw away your medicines at www.disposemymeds.org.          This list is accurate as of 7/11/18 11:56 AM.  Always use your most recent med list.                   Brand Name Dispense Instructions for use Diagnosis    ACETAMIN 500 MG tablet   Generic drug:  acetaminophen      Take 1-2 tablets by mouth every 6 hours as needed.        albuterol 90 MCG/ACT inhaler      Inhale 2 puffs into the lungs every 6 hours as needed.        baclofen 10 MG tablet    LIORESAL    810 tablet    Take 2-3 tablets (20-30 mg) by mouth 3 times daily    Muscle spasm       BUPROPION HCL PO      Take 150 mg by mouth daily        CALTRATE 600+D PLUS PO      Take 2 tablets by mouth daily.        captopril 50 MG tablet    CAPOTEN     Take  by mouth. 2 tablets in am and 1 tablet in pm        CENTRUM SILVER per tablet      Take 1 tablet by mouth daily         fluticasone 100 MCG/BLIST Aepb    FLOVENT DISKUS     Inhale 2 puffs into the lungs every 12 hours        gabapentin 8 % Gel topical PLO cream     60 g    Apply 2-4 g topically 2 times daily as needed To bilateral feet    Neuropathic pain       HYDROcodone-acetaminophen 5-325 MG per tablet    NORCO    30 tablet    Take 1 tablet by mouth every 4 hours as needed.    Post-operative state       priLOSEC 20 MG CR capsule   Generic drug:  omeprazole      Take 20 mg by mouth 2 times daily.        QVAR 40 MCG/ACT Inhaler   Generic drug:  beclomethasone      Inhale 2 puffs into the lungs 2 times daily as needed.        triamterene-hydrochlorothiazide 37.5-25 MG per tablet    MAXZIDE-25     Take 1 tablet by mouth daily.        ZOLOFT 100 MG tablet   Generic drug:  sertraline      Take 100 mg by mouth daily.

## 2018-07-11 NOTE — PROGRESS NOTES
"Physical medicine rotation clinic:    Ahsan is a 73-year-old woman with spastic diplegic cerebral palsy who I had last seen about 6 months ago.  She returns on a routine basis.  Since last visit she has had esophageal dilation.  She reports difficulty swallowing pills but in general not food.  She feels that helped some but does not last all that long.  Procedure itself is uncomfortable so she is hesitant to repeat it too often.    At last visit she was describing some more generalized aches and pains and we did do some laboratory workup which did not identify any particular issues.  She reports today that things overall are fairly stable.  She does continue to have some soreness in broader distribution at different times but nothing new or different.    One concern she is reporting some increased difficulty with car transfers.  She tends to have sounds like extension spasticity, her caregiver describes it as \"a washboard\". She does not seem to have as much of the stiffness without a transfers such as to commode or to her chair. She has baclofen Rx says 10-20 mg TID and at first sayng she takes just 20 at bedtime but then corrects herself saying takes the 20 mg TID.    She does have a power wheelchair which she uses most of the time.  His assistance in the morning and evening from a personal care attendant.  She is then up throughout the day in her power chair.  She also has a manual wheelchair but is used only for situations when the power chair is out of commission or if she is traveling somewhere with her PCA the car when a lift van is not available.  Her current home wheelchair is showing signs of wear and tear.  At her last visit in January he was describing some uncomfortable issues and I did send her to formal wheelchair seating assessment though Ahsan does not recall any specifics from that visit.  She reports there is a wheelchair expert by the name of Dominic she has worked with in the past but she is not " sure with what group or vendor he is with.  He had apparently talked with her a while ago and recommended she might be available to get a new power wheelchair as early as this October.    Physical exam:  /71  Pulse 77  Ht 5' (1.524 m)   Alert pleasant, fair to good historian.  Fluent speech  Arrives with power wheelchair that is showing signs of of wear and tear.   Has some longer standing slight knee flexion contractures but otherwise good range of motion at the elbows wrists fingers and ankles.  Tone 2/4 in bilateral legs but no involuntary spasms seen.  Skin changes with hyperpigmentation in bilateral LE's below about mid calf.    Assessment and recommendations:  1. We will send formal physical therapy referral with some folks exploration on car transfers and spasticity management but also transfers in general and maintenance exercise program.  2. She has baclofen currently for spasticity and we can explore slightly higher dose.  Currently 20 mg 3 times daily she can try going up to 30 mg 3 times daily.  Prescription sent to her pharmacy.  Stressed importance of maintenance exercise plan.  3. Follow for also being sent for wheelchair seating assessment, occupational therapist at the [facility].  Today will count as her formal face-to-face evaluation.  4. Follow-up in about a year. She will contact us sooner if any functional or rehab issues arise.    40 minutes spent in direct patient interaction, greater than 50% counseling and education on the above detailed items.

## 2018-07-11 NOTE — PATIENT INSTRUCTIONS
Trial increasing baclofen dose.  Currently using 20 mg (2×10 mg tablets) 3 times daily.  Can increase to 30 mg or 3 tablets up to 3 times daily. If you are sleepy as a side effect or the spasms are not any better, go back down to the 20 mg dosing.  Call Dr. Posey to get any questions about this.

## 2018-07-20 ENCOUNTER — TELEPHONE (OUTPATIENT)
Dept: PHYSICAL MEDICINE AND REHAB | Facility: CLINIC | Age: 73
End: 2018-07-20

## 2018-07-20 NOTE — TELEPHONE ENCOUNTER
Health Call Center    Phone Message    May a detailed message be left on voicemail: yes    Reason for Call: Other: Pt is asking for a manual chair. Dr. Posey had suggested getting a folding chair but, at the time, she declined. She states she would like to move forward with this. Please call back to discuss.     Action Taken: Message routed to:  [facility] SULMA PAYNE

## 2018-07-27 NOTE — TELEPHONE ENCOUNTER
Attempted phone call to patient. Left message on patient's voice mail. Patient has a OT appointment next week and can discuss with the therapist regarding manual wheelchair.

## 2018-07-31 ENCOUNTER — HOSPITAL ENCOUNTER (OUTPATIENT)
Dept: OCCUPATIONAL THERAPY | Facility: CLINIC | Age: 73
Setting detail: THERAPIES SERIES
End: 2018-07-31
Attending: PHYSICAL MEDICINE & REHABILITATION
Payer: COMMERCIAL

## 2018-07-31 PROCEDURE — 97542 WHEELCHAIR MNGMENT TRAINING: CPT | Mod: GO | Performed by: OCCUPATIONAL THERAPIST

## 2018-07-31 PROCEDURE — 40000132 ZZH STATISTIC OT SEATING/WHEELED MOBILITY VISIT: Performed by: OCCUPATIONAL THERAPIST

## 2018-07-31 NOTE — PROGRESS NOTES
SEATING AND WHEELED MOBILITY ASSESSMENT  07/31/18 0900   Quick Adds   Quick Adds Current Manual Wheelchair   General Information   Rehab Discipline OT   Funding [employer]   Service Outpatient;Occupational Therapy;Seating/Wheeled Mobility Evaluation   Height 5'   Weight 135   Start Of Care Date 07/31/18   Referring Physician Domingo Posey   Orders Evaluate And Treat As Indicated;Per Therapist Evaluation   Orders Date 07/11/18   Patient/Caregiver Goals Per  talk about new wheelchair, manual options   Current Community Support Personal Care Attendant  (AM/PM cares 2-3 hrs at a cindy)   Patient role/Employment history Disabled;Retired   General Information Comments alone 19hr/day   Medical History   Onset Of Illness/injury Or Date Of Surgery 7/11/18  (order)   Medical Diagnosis CP, bronchitis   Cardio-Respiratory Status Inhaler   Recent or Planned Surgeries 2012 breast reduction   Comments has esophagus dialated yearly   Current Power Wheelchair   Age 3 years    J6   Cushion Gel  (flotech)   Back Solid Curved   Footrest Style PELRs   Headrest Yes   Settings Used Home;Outdoor Community Mobility;Primary Means of Transportation   Condition Good   Positioning Features Tilt Seat;Power Elevating Leg Rests   Power Control Left Joystick   Current Equipment Requires Update   Rational for Equipment Changes Dr. Posey wanting to talk about wheelchair options, patient interested in new manual chair options   Power Wheelchair Comments sits 12-14hrs   Home Accessibility   Living Environment Apartment/condo   Primary Entrance Elevator   All Rooms Wheelchair Accessible Yes   Community ADL   Transportation Transportation Services   Community Mobility Requirements Medical Appointments;Shopping;Shinto   Cognitive/Visual/Hearing Status   Observations No Problems Observed During Evaluation   Vision Corrective Lenses   Hearing Hearing Aide(s)   ADL Status   Feeding Independent   Grooming/Hygiene Independent   Dressing Requires  Assist   Toileting Uses Equipment  (grab bars)   Bathing Uses Equipment;Requires Assist  (roll in shower chair)   Meal Preparation Requires Assist   Home Management Unable   Fatigue   Reported Problems spontaneous naps   Balance   Unsupported Sitting Balance Uses Upper Extremities for Balance   Sitting Balance in Chair Uses Upper Extremities for Balance   Standing Balance Uses Upper Extremities for Balance;Physical Assist Required   Ambulation   Ambulation Non Ambulatory   Transfers   Transfer Assist Moderate Assist   Transfer Method Stand Pivot   Sleep/Rest   Sleep Surface/Equipment sleep number adjustable   Wheelchair Ability   Wheelchair Ability Quick Adds Power Chair   Power Wheelchair Propulsion   Operate Power Wheelchair Standard Joystick   Comments tilt and power legs   Wheelchair Use   Ability to Perform Weight Shifts Unable   Bed Confined Without Wheelchair? Yes   Hours in Wheelchair Daily 10   Hours Spent Alone Daily 19   Neuromuscular   History of Pressure Sores No   Current Pressure Sores No   Additional Pressure Sores? No   Coordination UE Impaired;LE Impaired   Tone Spasticity   Sensory Deficits Reported none   Sensation on Seating Surface Intact per Report   Head and Neck   Head and Neck Position Functional   Head Control Good   Upper Extremities   Shoulder Position Functional Bilaterally   UE ROM full   Pelvis   Anterior/Posterior Pelvis Position Posterior Tilt   Trunk   Anterior/Posterior Trunk Position Increased Thoracic Kyphosis   Lower Extremities   LE ROM tight hamstrings and hips in external rotation   LE Strength 1-2-/5   Education Assessment   Barriers to Learning Cognitive   Preferred Learning Style Listening;Demonstration   Assessment/Plan   Criteria for Skilled Interventions Met Yes, Treatment Indicated   Treatment Diagnosis impaired mobility   Influenced by the Following Impairments spasticity and weakness   Functional Limitations Due to Impairments non ambulatory   Therapy Frequency up  "to 4 visits    Predicted Duration of Therapy Intervention in 90days   Planned Therapy Interventions Wheelchair Management/Propulsion Training   Planned Therapy Interventions Comments Dicussed options for manual chair.  Insurance will not cover both manual and power chair.  She only wants a replacement as its heavy for her PCA to lift.  Educated on all options and gave ideas for a better transport chair.  Patient to talk to [facility] about it.  Educated on idea need to not be in this type of a chair for a long period.  Challenges with need to pick a different vendor than,\"leslie\".  He is not a vendor a I work with and is not vendormate certified. There are no clear deficits with her current chair other than general wear and tear.  It fits her properly, electronics work well, and she reports comfort.  Would like to transition her to a group 3 with other seat functions but patient is not wanting to increase the size of her chair. She also is not ready to get a new chair this point and thus attempting to go through this process would not be successfull.  Will keep patietn file open for 90 days in order to see if patient changes her mind or new needs come up.     Risks and benefits of treatment have been explained Yes   Patient/family & other staff in agreement with plan of care Yes   Session Time   Total Treatment Time 60   Total Session Time 60   OT Goal 1   Goal Identifier power w/c   Goal Description Patient/family demontrastes understanding of equipment for reducing/accommodating postural deviations, including benefits/limitations   Target Date 10/29/18   Electronically signed by:  Renetta Graham OTR/L, ATP      Occupational Therapist, Assistive   [phone]      fax: [phone]      [email or web address]  Seating Clinic- [facility], [address]  [address]  [place]   [address]      "

## 2018-08-10 ENCOUNTER — HOSPITAL ENCOUNTER (OUTPATIENT)
Dept: PHYSICAL THERAPY | Facility: CLINIC | Age: 73
Setting detail: THERAPIES SERIES
End: 2018-08-10
Attending: PHYSICAL MEDICINE & REHABILITATION
Payer: COMMERCIAL

## 2018-08-10 PROCEDURE — 97161 PT EVAL LOW COMPLEX 20 MIN: CPT | Mod: GP | Performed by: PHYSICAL THERAPIST

## 2018-08-10 PROCEDURE — 97530 THERAPEUTIC ACTIVITIES: CPT | Mod: GP | Performed by: PHYSICAL THERAPIST

## 2018-08-10 PROCEDURE — 40000719 ZZHC STATISTIC PT DEPARTMENT NEURO VISIT: Performed by: PHYSICAL THERAPIST

## 2018-08-10 NOTE — IP AVS SNAPSHOT
MRN:[id number]                      After Visit Summary   8/10/2018    Benji Shankar    MRN: [id number]           Visit Information        Provider Department      8/10/2018 10:30 AM Kimberly Stein, PT [facility] [place], Physical Therapy - Outpatient        Your next 10 appointments already scheduled     Aug 20, 2018  9:30 AM CDT   Neuro Treatment with Kimberly Setin, PT   [facility] [place] Physical Therapy - Outpatient ([facility])    [address], [address]  [place] [address]   [phone]            2019 11:00 AM CDT   (Arrive by 10:45 AM)   Return Visit with Domingo Posey MD   [facility] Physical Medicine and Rehabilitation ([facility])    [address] [address] [phone]                Further instructions from your care team       Next PT session brin. Sofiya and Sofiya's car  2. Slide board  3. Manual chair  4. Ahsan    Stay in the car - just send Sofiya in to check you in. I'll come out to the car to see how you're transferring, and we'll practice other options.          Care EveryWhere ID     This is your Care EveryWhere ID. This could be used by other organizations to access your [place] medical records  [id number]        Equal Access to Services     JANETTE MARTINEZ AH: Loren sumner Solaurie, waaxda luqadaha, qaybta kaalmada adeegyada, doris hoskins. So [facility] [phone].    ATENCIÓN: Si habla español, tiene a thompson disposición servicios gratjensos de asistencia lingüística. Llame al [phone].    We comply with applicable federal civil rights laws and [place] laws. We do not discriminate on the basis of race, color, national origin, age, disability, sex, sexual orientation, or gender identity.

## 2018-08-10 NOTE — DISCHARGE INSTRUCTIONS
Next PT session brin. Sofiya and Sofiya's car  2. Slide board  3. Manual chair  4. Ahsan    Stay in the car - just send Sofiya in to check you in. I'll come out to the car to see how you're transferring, and we'll practice other options.

## 2018-08-11 NOTE — PROGRESS NOTES
"   08/10/18 1000   Quick Adds   Type of Visit Initial OP PT Evaluation   General Information   Start of Care Date 08/10/18   Referring Physician Dr Domingo Posey   Orders Evaluate and Treat as Indicated   Order Date 07/11/18   Medical Diagnosis Spastic diplegic cerebral palsy    Onset of illness/injury or Date of Surgery 07/11/18   Special Instructions 73-year-old with spastic diplegic CP having some increased difficulties with car transfers and extensor tone pattern.  Physical therapy to evaluate these transfers, spasticity and function in general.   Surgical/Medical history reviewed Yes   Pertinent history of current problem (include personal factors and/or comorbidities that impact the POC) Pt is known to this clinician from past episode. Pt with CP, been primary power w/c user for years. Pt notes that with Sofiya (her primary PCA), she is still doing stand pivot transfers. Has SaraSteady for doing transfer with her weekend PCA and those have been going great. No concerns with transfers in her apartment. No instances of being left in bed over a weekend, as had been the situation at last year's PT eval. Concern now is more difficulty with car transfer - wants to be able to get out more with her PCA without arranging for [employer]. Complexity: chronic pain with \"arthritis all over.\" CP immobility.   Living environment Apartment/condo   Home/Community Accessibility Comments elevator   Current Assistive Devices Power Wheel Chair;Transfer Board   ADL Devices Wall Grab Bar;Other  (SaraSteady)   Patient/Family Goals Statement Work on car transfer.   General Information Comments Exercise: standing frame for 30mins-2hrs nearly daily. Doing LE stretches and exercises in bed 2-3x per week.   Fall Risk Screen   Fall screen completed by PT   Have you fallen 2 or more times in the past year? No   Have you fallen and had an injury in the past year? No   Is patient a fall risk? Yes   Pain   Patient currently in pain Yes   Pain " "location Arthritis \"all over\" per pt. Generally more comfortable in w/c than she was last year - got a new cushion.   Cognitive Status Examination   Orientation orientation to person, place and time   Level of Consciousness alert   Follows Commands and Answers Questions 100% of the time;able to follow multistep instructions   Personal Safety and Judgment intact   Memory intact   Posture   Posture Forward head position;Protracted shoulders;Kyphosis   Posture Comments seated: sacral sitting, forward head, protracted shoulders. Standing NT today due to previous experience standing with her. Standing for exercise or function >30 secs is not a goal.   Strength   Strength Comments 1/5 B DF, 1+/5 L PF, 1/5 R PF. Quads B 4/5. HS B 4+/5, hip flex B 3/5. B sh flex 4-/5, biceps/triceps 4+5/ B,  good B.   Transfer Skills   Transfer Comments Per pt description and PT experience: Pt transfers w/c to bed by mod lifting A sit/stand, pt places hands on bed. PCA pushes power w/c back and then lifts up LEs from the thighs. Pt then effectively rolls onto L side and to back. Did not trial this today - instructed in slide board transfer instead (see daily note).   Gait   Gait Comments non-ambulatory   Balance   Balance Comments Able to sit unsupported edge of bed with supervision and lift one hand. Not able to reach outside WENDIE.   Sensory Examination   Sensory Perception no deficits were identified   Coordination   Coordination Comments Slowed with all UE, trunk and LE motions.   Muscle Tone   Muscle Tone Comments Leopoldo 2 throughout LEs except for HS which are 3.   Planned Therapy Interventions   Planned Therapy Interventions transfer training   Clinical Impression   Criteria for Skilled Therapeutic Interventions Met yes, treatment indicated   PT Diagnosis impaired car transfers   Influenced by the following impairments increased tone B LEs, decreased LE strength, diffuse arthritis.   Functional limitations due to impairments " impaired transfers - needs max A or use of Cesilia Steady for safe transfers. Needs skilled input for safe car transfer.   Clinical Presentation Stable/Uncomplicated   Clinical Presentation Rationale similar functional level from last year.   Clinical Decision Making (Complexity) Low complexity   Therapy Frequency 1 time/week   Predicted Duration of Therapy Intervention (days/wks) 60 days   Risk & Benefits of therapy have been explained Yes   Patient, Family & other staff in agreement with plan of care Yes   Clinical Impression Comments Pt's main focus is improved car transfers. Will coordiate with pt and PCA for combined visit to assess function.   Goal 1   Goal Identifier Car transfer   Goal Description Pt demo ability to complete car transfer with PCA to/from manual w/c in order to access community.   Target Date 10/08/18   Total Evaluation Time   Total Evaluation Time (Minutes) 25

## 2018-08-20 ENCOUNTER — HOSPITAL ENCOUNTER (OUTPATIENT)
Dept: PHYSICAL THERAPY | Facility: CLINIC | Age: 73
Setting detail: THERAPIES SERIES
End: 2018-08-20
Attending: PHYSICAL MEDICINE & REHABILITATION
Payer: COMMERCIAL

## 2018-08-20 PROCEDURE — 40000719 ZZHC STATISTIC PT DEPARTMENT NEURO VISIT: Performed by: PHYSICAL THERAPIST

## 2018-08-20 PROCEDURE — 97530 THERAPEUTIC ACTIVITIES: CPT | Mod: GP | Performed by: PHYSICAL THERAPIST

## 2018-10-05 DIAGNOSIS — G80.1 SPASTIC DIPLEGIC CEREBRAL PALSY (H): Primary | ICD-10-CM

## 2018-10-05 NOTE — ADDENDUM NOTE
Encounter addended by: Renetta Graham OT on: 10/5/2018  3:46 PM<BR>     Actions taken: Sign clinical note

## 2018-10-05 NOTE — PROGRESS NOTES
Outpatient Occupational Therapy Discharge Note     Patient: Benji Shankar  : 1945    Beginning/End Dates of Reporting Period:  18    Referring Provider: Taty Onofre Diagnosis: impaired independence with MRADLS    Client Self Report:   Im not comfortable      Goals:     Goal Identifier cushion   Goal Description Patient to demonstrate a successful clinical trial of the recommended wheelchair   Target Date 18   Date Met   not met   Progress: Patient seen for one Ot session where training on current cushion and trial of another more contoured cushion was completed.  Set up one more apt to trial and adjust her ROHO.  She cancelled and reported she was happy with what she had.     Progress Toward Goals:   Not assessed this period.    Plan:  Discharge from therapy.    Reason for Discharge: No further expectation of progress.  Patient chooses to discontinue therapy.  Patient has not made expected progress due to interrupted treatment attendance.  Patient has failed to schedule further appointments.  Medicare G-code: Patient did not attend a final scheduled session prior to discharge. Unable to determine discharge functional status.      Discharge Plan: Use current cushion and off loading techniques.  Electronically signed by:  Renetta PEDRO/INDIRA, ATP      Occupational Therapist, Assistive   [phone]      fax: [phone]      [email or web address], [address]  [place] [address]

## 2018-10-10 ENCOUNTER — CARE COORDINATION (OUTPATIENT)
Dept: PHYSICAL MEDICINE AND REHAB | Facility: CLINIC | Age: 73
End: 2018-10-10

## 2018-10-10 NOTE — PROGRESS NOTES
A RX from Dr Posey for a shower chair with wheels was mailed to the patient. She will bring the RX to her preferred DME provider.

## 2018-11-05 NOTE — ADDENDUM NOTE
Encounter addended by: Kimberly Stein, PT on: 11/5/2018 11:01 AM<BR>     Actions taken: Episode resolved, Sign clinical note

## 2018-11-05 NOTE — PROGRESS NOTES
Outpatient Physical Therapy Discharge Note     Patient: Benji Shankar  : 1945    Beginning/End Dates of Reporting Period:  8/10/18 to 2018 (2 visits)    Referring Provider: Dr Domingo Posey    Therapy Diagnosis: impaired car transfers     Client Self Report: PCA Sofiya present for session, receptive to feedback.    Goals:  Goal Identifier Car transfer   Goal Description Pt demo ability to complete car transfer with PCA to/from manual w/c in order to access community.   Target Date 10/08/18   Date Met   18   Progress: PCA instructed in method of car transfer without as much strain to facilitate ease of access into community. Pt did not return to clinic after this training, which she was instructed to do if was [place]       Progress Toward Goals:   Progress this reporting period:See above.    Plan:  Discharge from therapy.    Discharge:    Reason for Discharge: Patient has met all goals.  Patient has failed to schedule further appointments.  Medicare G-code: Patient did not attend a final scheduled session prior to discharge. Unable to determine discharge functional status.    Equipment Issued: none - pt had slide board already    Discharge Plan: Patient to continue home program.

## 2018-11-26 NOTE — ADDENDUM NOTE
Encounter addended by: Kimberly Stein, PT on: 11/26/2018  4:58 PM<BR>     Actions taken: Sign clinical note

## 2018-11-26 NOTE — PROGRESS NOTES
To Whom It May Concern:    I am a Physical Therapist that has been working with Benji Shankar ( 1945) off and on for several years due to mobility concerns with her spastic CP. She has made the transition as she has been aging to requiring use of a standing transfer device called a Cesilia Steady to transfer from the edge of her bed to her power wheelchair. It is not possible for her to perform her bed mobility (including laying down to/from sitting) without max assistance of a caregiver.    Benji requires max assistance of a caregiver for ADLs including dressing, showering (including use of a shower chair), toileting, shopping, cooking and cleaning. Additionally, her caregivers assist her with exercises to prevent contractures and further loss of muscle, a program that has been developed by myself. Currently her needs are able to be met in two 4-hour bouts of caregiver time in the morning and evening (8 hours per day, 7 days per week). Given the amount of time that it takes her to complete her tasks, it is not reasonable for her caregiving time to be decreased.    The PCA hours that have been funded by the [facility] have allowed her to be able to stay in her independent living apartment. If her hours were to be cut, she would need to transition to an assisted living facility, which would be more costly to the [facility].    If you have any questions or concerns regarding her needs, please contact me.    Sincerely,            Laurie Stein DPT, [facility]   Physical Therapist  [facility]  [address]  [address] [place] [address]   [email or web address]  [email or web address]  Office: [phone]   Pager:[phone]  Fax: [phone]

## 2019-02-27 ENCOUNTER — DOCUMENTATION ONLY (OUTPATIENT)
Dept: CARE COORDINATION | Facility: CLINIC | Age: 74
End: 2019-02-27

## 2019-04-19 ENCOUNTER — OFFICE VISIT (OUTPATIENT)
Dept: PHYSICAL MEDICINE AND REHAB | Facility: CLINIC | Age: 74
End: 2019-04-19
Payer: COMMERCIAL

## 2019-04-19 VITALS — OXYGEN SATURATION: 100 % | HEART RATE: 76 BPM

## 2019-04-19 DIAGNOSIS — F32.5 MAJOR DEPRESSIVE DISORDER IN REMISSION, UNSPECIFIED WHETHER RECURRENT (H): Primary | ICD-10-CM

## 2019-04-19 DIAGNOSIS — G80.1 SPASTIC DIPLEGIC CEREBRAL PALSY (H): ICD-10-CM

## 2019-04-19 PROBLEM — R13.10 DYSPHAGIA, UNSPECIFIED TYPE: Status: ACTIVE | Noted: 2017-05-09

## 2019-04-19 PROBLEM — G80.9 CEREBRAL PALSY, UNSPECIFIED TYPE (H): Status: ACTIVE | Noted: 2017-07-19

## 2019-04-19 PROBLEM — J45.909 ASTHMA: Status: ACTIVE | Noted: 2017-07-19

## 2019-04-19 PROBLEM — J45.20 MILD INTERMITTENT ASTHMA WITHOUT COMPLICATION: Status: ACTIVE | Noted: 2019-04-19

## 2019-04-19 PROBLEM — R11.0 NAUSEA: Status: ACTIVE | Noted: 2017-07-19

## 2019-04-19 PROBLEM — M81.0 OSTEOPOROSIS: Status: ACTIVE | Noted: 2017-07-19

## 2019-04-19 PROBLEM — I10 ESSENTIAL HYPERTENSION: Status: ACTIVE | Noted: 2017-07-19

## 2019-04-19 PROBLEM — E87.6 HYPOKALEMIA: Status: ACTIVE | Noted: 2017-07-19

## 2019-04-19 PROBLEM — R14.0 ABDOMINAL BLOATING: Status: ACTIVE | Noted: 2017-07-19

## 2019-04-19 PROBLEM — F32.A DEPRESSION: Status: ACTIVE | Noted: 2017-07-19

## 2019-04-19 PROBLEM — E86.0 DEHYDRATION: Status: ACTIVE | Noted: 2017-07-19

## 2019-04-19 NOTE — PROGRESS NOTES
Rehabilitation Clinic Note   4/19/2019    HPI:    She is tearful and overwhelmed today regarding her decline in function.    Specifially, she feels her standing has declined and her toileting is more difficult; she is currently using grab bars in the bathroom and her PCA is needed to manage her pants, which represents a decline in function for her. She needs more help than Sofiya, her current PCA, is able to provide currently and she is pursuing increased services through a PCA company. She is concerned, however, that receiving more care through a PCA company may mean she loses hours with Sofiya.     She is still on the [employer] Waiver Program but has been unable to get adequate coverage for some of the equipment she needs, specifically a shower chair. She is quite frustrated in trying to understand how to navigate this system.     Her wheelchair no longer fits her. She had been seen for a wheelchair seating evaluation with Renetta Graham on 7/31/18, but this was not fruitful at the time and she feels she has lost further function since that time.     Finally, she is having difficulty sleeping and has questions regarding re-starting valium for sleep.     PHYSICAL EXAM:  Gen: elderly woman, tearful  HEENT: dysconjugate gaze (long standing exotropia), head forward posture  Resp: no respiratory distress on RA  Psych: tearful    ASSESSMENT:   Ahsan is a 74-year-old woman with spastic diplegic cerebral palsy who originally presented for wheelchair seating eval face-to-face, but expressed substantial concern today feeling overwhelmed with a decline in function and navigating care coverage.     PLAN:  #Mobility:   - Pending discussion with Renetta and Laurie (therapists), consider referral for wheelchair seating assessment vs. home therapies for evaluation of her function in home.  Today could also serve as formal face to face for power wheelchair. This will be lifelong need.    #Decreased independence in ADLs and IADLs:  - Our   Annika Rosales will call Ahsan to discuss options/plan for navigating the [employer] Waiver Program to help investigate equipment and care services coverage.    #Mood:   She is very tearful in clinic today and endorses feeling overwhelmed. Her medication list includes 150 mg bupropion every day, but she is unsure as to whether she is currently taking it.   - Psychology referral for her mood and increased anxiety. Our  will help her navigate the preferred provider. Primary care Dr. Santana may consider alternate dose /  Different antidepressant / anxiolytic.    #Insomnia:   She is unsure of the contribution due to anxiety/feeling overwhelmed vs. (less likely) intermittent hip flexor spasms. Previously she had been on valium and had requested this medication, but we discussed that we need to identify the root cause of her insomnia prior to a final decision on management plan. We will address the anxiety/feeling of being overwhelmed as above and re-assess when she returns to clinic.     RTC: 3 months    I, Dr. Posey, also saw and examined Ahsan throughout and managed all counseling myself. Scribe components by resident reflect my conversation and content. I have reviewed and edited the above resident note and agree.  My key decisions and exam referrals to PT and OT be deciding if in home vs outpatient. Needs mental health support and navigating her insurance and support situation needs. SW to reach out to Ahsan. I have messaged all three for insights.  50 minutes spent in direct patient interaction greater than 50% in counseling and education.

## 2019-04-19 NOTE — LETTER
4/19/2019       RE: Benji Shankar  [address]  [facility] [address]     Dear Colleague,    Thank you for referring your patient, Benji Shankar, to the [facility] PHYSICAL [facility]ICINE [facility] REHABILITATION at [facility]. Please see a copy of my visit note below.    Rehabilitation Clinic Note   4/19/2019    HPI:    She is tearful and overwhelmed today regarding her decline in function.    Specifially, she feels her standing has declined and her toileting is more difficult; she is currently using grab bars in the bathroom and her PCA is needed to manage her pants, which represents a decline in function for her. She needs more help than Sofiya, her current PCA, is able to provide currently and she is pursuing increased services through a PCA company. She is concerned, however, that receiving more care through a PCA company may mean she loses hours with Sofiya.     She is still on the [employer] Waiver Program but has been unable to get adequate coverage for some of the equipment she needs, specifically a shower chair. She is quite frustrated in trying to understand how to navigate this system.     Her wheelchair no longer fits her. She had been seen for a wheelchair seating evaluation with Renetta Graham on 7/31/18, but this was not fruitful at the time and she feels she has lost further function since that time.     Finally, she is having difficulty sleeping and has questions regarding re-starting valium for sleep.     PHYSICAL EXAM:  Gen: elderly woman, tearful  HEENT: dysconjugate gaze (long standing exotropia), head forward posture  Resp: no respiratory distress on RA  Psych: tearful    ASSESSMENT:   Ahsan is a 74-year-old woman with spastic diplegic cerebral palsy who originally presented for wheelchair seating eval face-to-face, but expressed substantial concern today feeling overwhelmed with a decline in function and navigating care coverage.      PLAN:  #Mobility:   - Pending discussion with Renetta and Laurie (therapists), consider referral for wheelchair seating assessment vs. home therapies for evaluation of her function in home.  Today could also serve as formal face to face for power wheelchair. This will be lifelong need.    #Decreased independence in ADLs and IADLs:  - Our  Annika Wyliesuhail will call Ahsan to discuss options/plan for navigating the C[employer] Waiver Program to help investigate equipment and care services coverage.    #Mood:   She is very tearful in clinic today and endorses feeling overwhelmed. Her medication list includes 150 mg bupropion every day, but she is unsure as to whether she is currently taking it.   - Psychology referral for her mood and increased anxiety. Our  will help her navigate the preferred provider. Primary care Dr. Santana may consider alternate dose /  Different antidepressant / anxiolytic.    #Insomnia:   She is unsure of the contribution due to anxiety/feeling overwhelmed vs. (less likely) intermittent hip flexor spasms. Previously she had been on valium and had requested this medication, but we discussed that we need to identify the root cause of her insomnia prior to a final decision on management plan. We will address the anxiety/feeling of being overwhelmed as above and re-assess when she returns to clinic.     RTC: 3 months    I, Dr. Posey, also saw and examined Ahsan throughout and managed all counseling myself. Scribe components by resident reflect my conversation and content. I have reviewed and edited the above resident note and agree.  My key decisions and exam referrals to PT and OT be deciding if in home vs outpatient. Needs mental health support and navigating her insurance and support situation needs. SW to reach out to Ahsan. I have messaged all three for insights.  50 minutes spent in direct patient interaction greater than 50% in counseling and education.    Again, thank you  for allowing me to participate in the care of your patient.      Sincerely,    Domingo Posey MD

## 2019-04-19 NOTE — PATIENT INSTRUCTIONS
1. We will talk with Jeovany (therapists) to make the best plan to help improve your function. Probably recommending either in-home therapy vs. Outpatient therapy.     2. We have made a referral to  to help you navigate the [employer] Program to see if you can get necessary equipment and care services (like a shower chair) covered. They will also help you get connected with a psychologist to help with your feelings of being overwhelmed.   OK for her PCA Sofiya Marinelli to speak with .

## 2019-04-22 ENCOUNTER — DOCUMENTATION ONLY (OUTPATIENT)
Dept: CARE COORDINATION | Facility: CLINIC | Age: 74
End: 2019-04-22

## 2019-04-22 DIAGNOSIS — G80.0 SPASTIC QUADRIPLEGIC CEREBRAL PALSY (H): Primary | ICD-10-CM

## 2019-04-22 NOTE — PROGRESS NOTES
[facility] will be sharing updates with you on Referral requests for home care services.  This is for care coordination purposes and alert you to referral status.    Dear Domingo Thrasher   Thank You for the referral for [facility], for Benji Shankar; MRN [id number].  At this time, [facility] is experiencing a high volume of referrals and is not able to meet your patient s needs in a safe and timely fashion for SN, PT,OT and SW.  We have transferred this client to our partnering agency [facility] [phone],  Fax [phone].    Partner Agency will be calling the client to set up an assessment visit within our 48hr window.  They will follow up with the PCP later this week to establish POC orders.  Client is agreeable to this plan.  We have forwarded the MD orders, H&P, DC Summary, procedure reports, EPIC Face Sheet and financial information to our partner agency.  We have confirmed receipt of the fax and their acceptance of the referral.  Thank you again for the referral,  Bharathi Ch,   [phone]

## 2019-05-01 ENCOUNTER — TELEPHONE (OUTPATIENT)
Dept: BEHAVIORAL HEALTH | Facility: CLINIC | Age: 74
End: 2019-05-01

## 2019-05-01 NOTE — TELEPHONE ENCOUNTER
Writer received a message from , Annika Bledsoe who had a question about the psychology referral that was placed. Writer was not familiar with the referral that was placed so she called client to see if they did establish care with a therapist.     Client cannot see [facility], as [facility] cannot accept medicare patients. This was confirmed by Georgia Byrd.    Client stated she received a call about an in home therapist coming to visit her. Client was busy at the time and could not talk long. Client states she will be calling them to set up an in home therapy appointment soon.

## 2019-05-03 ENCOUNTER — TELEPHONE (OUTPATIENT)
Dept: PHYSICAL MEDICINE AND REHAB | Facility: CLINIC | Age: 74
End: 2019-05-03

## 2019-05-03 NOTE — TELEPHONE ENCOUNTER
[facility] Call Center    Phone Message    May a detailed message be left on voicemail: yes    Reason for Call: Other: Kristina called stating that the pt needs a wheelchair assessment. She says that pt currently has a power wheelchair now but it is 5 years old and she either needs modifications or a new one altogether. Kristina states that they would need an order sent to [employer] and she provided the fax number as [phone]. Please give Kristina a call back to discuss.      Action Taken: Message routed to:  [facility] PM&R

## 2019-05-06 ENCOUNTER — PATIENT OUTREACH (OUTPATIENT)
Dept: CARE COORDINATION | Facility: CLINIC | Age: 74
End: 2019-05-06

## 2019-05-07 NOTE — TELEPHONE ENCOUNTER
[facility] Call Center    Phone Message    May a detailed message be left on voicemail: yes    Reason for Call: Other: Kristina calling back re: message below. Handi still has not received anything. Please fax the order and follow up with Kristina.     Action Taken: Message routed to:  [facility] SULMA PAYNE

## 2019-05-10 NOTE — PROGRESS NOTES
Social Work Intervention  [facility]    Data/Intervention:    Patient Name:  Benji Shankar  /Age:  1945 (74 year old)    Visit Type: telephone  Referral Source: Dr Posey  Reason for Referral:  Psychosocial issues    Collaborated With:    -Ahsan  -Katerina,  [phone]  -[facility]    Patient Concerns/Issues:   Ahsan indicated that she hasn't been able to get the roll in shower chair from [facility] that she needs. She isn't sure why they won't release it to her. Sofiya her primary PCA is going on vacation soon.She also has a weekend PCA. She has care arranged for when Sofiya is on vacation.   She also would like in home counseling if possible to help her cope with some of the difficulties she is having. She reported that Izzy (nurse for Dr Posey) was referring her to a place in [place].  Her  for her services, Katerina is the contact for her service coordination and Pt provided verbal consent for me to contact her.  Her home care services ordered by Dr Posey were referred out by  home care to In Home services. Pt reports she doesn't really want PT/OT because when they are done, she feels like she will just be back to how she was before.     Intervention/Education/Resources Provided:  Contacted [facility] re her shower chair. They are waiting for a document from Katerina martinez coverage. I contacted Katerina. She wasn't aware they needed an authorization and she indicated she would contact them.   I sent a staff message to Izzy re counseling in . In the meantime, also referred to [facility] to see if she would qualify for in home counseling thru their agency. They will contact Pt to set up an appt and will contact me if they can't see her.    Assessment/Plan:  Pt very happy with her PCA Sofiya. She is aware she needs to work with Katerina re her services at home. Encouraged her to contact me if she needs further assistance.     Provided  patient/family with contact information and availability.    CHERYL Goodman, [facility]    [facility]  [phone]

## 2019-05-16 DIAGNOSIS — G80.1 SPASTIC DIPLEGIC CEREBRAL PALSY (H): Primary | ICD-10-CM

## 2019-06-04 ENCOUNTER — TELEPHONE (OUTPATIENT)
Dept: PHYSICAL MEDICINE AND REHAB | Facility: CLINIC | Age: 74
End: 2019-06-04

## 2019-06-04 NOTE — TELEPHONE ENCOUNTER
[facility] Call Center    Phone Message    May a detailed message be left on voicemail: yes    Reason for Call: Order(s): Other:   Reason for requested: Rolling shower chair   Date needed: as soon as possible   Provider name: Taty     Please call shannan to discuss.       Action Taken: Message routed to:  [facility] shabnam hines

## 2019-06-11 ENCOUNTER — HOSPITAL ENCOUNTER (OUTPATIENT)
Dept: OCCUPATIONAL THERAPY | Facility: CLINIC | Age: 74
Setting detail: THERAPIES SERIES
End: 2019-06-11
Attending: PHYSICAL MEDICINE & REHABILITATION
Payer: COMMERCIAL

## 2019-06-11 DIAGNOSIS — G80.1 SPASTIC DIPLEGIC CEREBRAL PALSY (H): ICD-10-CM

## 2019-06-11 PROCEDURE — 97542 WHEELCHAIR MNGMENT TRAINING: CPT | Mod: GO | Performed by: OCCUPATIONAL THERAPIST

## 2019-06-11 NOTE — PROGRESS NOTES
SEATING AND WHEELED MOBILITY ASSESSMENT  06/11/19 1000   Quick Adds   Quick Adds Current Power Wheelchair   General Information    Rehab Discipline OT   Funding [employer]   Service Outpatient;Occupational Therapy;Seating/Wheeled Mobility Evaluation   Height 5'   Weight 135   Start Of Care Date 06/11/19   Referring Physician Domingo Posey   Orders Evaluate And Treat As Indicated;Per Therapist Evaluation   Orders Date 05/16/19   Patient/Caregiver Goals Power w/c replacements   Rehabilitation Technology Supplier [employer]   Current Community Support Personal Care Attendant;Transportation Service   Patient role/Employment history Disabled;Retired  (AM/PM cares)   General Information Comments alone 19hr/day   Fall Risk Screen   Fall screen completed by OT   Have you fallen 2 or more times in the past year? No   Have you fallen and had an injury in the past year? No   Medical History   Onset Of Illness/injury Or Date Of Surgery 5/16/19  (order date)   Medical Diagnosis CP, bronchitis   Cardio-Respiratory Status Inhaler   Recent or Planned Surgeries 2012 breast reduction   Comments has esophagus dialated yearly   Current Power Wheelchair   Age 4 years    J6   Cushion Gel  (luma-id)   Back Solid Curved   Footrest Style PELRs   Headrest Yes   Settings Used Home;Outdoor Community Mobility;Primary Means of Transportation   Condition Poor   Positioning Features Tilt Seat;Power Elevating Leg Rests   Power Control Left Joystick   Current Equipment Requires Replacement   Home Accessibility   Living Environment Apartment/condo   Primary Entrance Elevator   All Rooms Wheelchair Accessible Yes   Community ADL   Transportation Transportation Services   Community Mobility Requirements Medical Appointments;Shopping;Yazdanism   Cognitive/Visual/Hearing Status   Observations No Problems Observed During Evaluation   Vision Corrective Lenses   Hearing Hearing Aide(s)   ADL Status   Feeding Independent   Grooming/Hygiene  Independent   Dressing Requires Assist   Toileting Uses Equipment  (grab bars )   Bathing Uses Equipment;Requires Assist  (roll in shower chair)   Meal Preparation Requires Assist   Home Management Unable   Fatigue   Reported Problems spontaneous naps   Balance   Unsupported Sitting Balance Uses Upper Extremities for Balance   Sitting Balance in Chair Uses Upper Extremities for Balance   Standing Balance Physical Assist Required   Ambulation   Ambulation Non Ambulatory   Transfers   Transfer Assist Moderate Assist   Transfer Method Stand Pivot   Transfer Comments Requires increased assistance with transfers and bed mobility   Sleep/Rest   Sleep Surface/Equipment sleep number adjustable with bed rail   Wheelchair Ability   Wheelchair Ability Quick Adds Power Chair   Power Wheelchair Propulsion   Operate Power Wheelchair Standard Joystick   Comments tilt and power legs   Wheelchair Use   Ability to Perform Weight Shifts Unable   Bed Confined Without Wheelchair? Yes   Hours in Wheelchair Daily 10   Hours Spent Alone Daily 19   Neuromuscular   History of Pressure Sores No   Current Pressure Sores No  (bottom hurts and burns after sitting, purple )   Additional Pressure Sores? No   Pain Yes   Pain Location all over   Coordination UE Impaired;LE Impaired   Tone Spasticity   Sensory Deficits Reported none   Sensation on Seating Surface Intact per Report   Head and Neck   Head and Neck Position Functional   Head Control Good   Upper Extremities   Shoulder Position Functional Bilaterally   UE ROM full   UE Strength 4/5   Dominance Left   Pelvis   Anterior/Posterior Pelvis Position Posterior Tilt   Trunk   Anterior/Posterior Trunk Position Increased Thoracic Kyphosis   Lower Extremities   Hip Position Abducted  (externally rotated)   LE ROM tight hamstrings and hips in external rotation.  Some active knee extension and hip flexion.   LE Strength 1-2-/5   LE Comments edema   Patient Measurements   Other see atp notes    Education Assessment   Barriers to Learning Cognitive   Preferred Learning Style Listening;Demonstration   Assessment/Plan   Criteria for Skilled Interventions Met Yes, Treatment Indicated   Treatment Diagnosis impaired mobility   Influenced by the Following Impairments spasticity and weakness   Functional Limitations Due to Impairments non ambulatory   Therapy Frequency once   Planned Therapy Interventions Wheelchair Management/Propulsion Training   Planned Therapy Interventions Comments Determined goals of increased comfort and posture.  Dicussed prior assesment and what resulted.  Needs group 3.  Adjustments made to headrest and backrest.  Needs to tilt more and educated on pressure relief mgmt.     Risks and benefits of treatment have been explained Yes   Patient/family & other staff in agreement with plan of care Yes   Comments Trials of Group 3 with tilt and power legs, standard joysick   Session Time   OT Wheelchair Management Minutes (31657) 60    OT Goal 1   Goal Identifier power w/c   Goal Description Patient/family demontrastes understanding of equipment for reducing/accommodating postural deviations, including benefits/limitations   Target Date 06/11/19   Electronically signed by:  Renetta PEDRO/INDIRA, ATP      Occupational Therapist, Assistive   [phone]      fax: [phone]      [email or web address]  Seating Clinic- [facility], [address]  [address]  [place]   [address]

## 2019-06-13 DIAGNOSIS — G80.0 SPASTIC QUADRIPLEGIC CEREBRAL PALSY (H): Primary | ICD-10-CM

## 2019-08-21 ENCOUNTER — TELEPHONE (OUTPATIENT)
Dept: PHYSICAL MEDICINE AND REHAB | Facility: CLINIC | Age: 74
End: 2019-08-21

## 2019-08-21 NOTE — TELEPHONE ENCOUNTER
[facility] Call Center    Phone Message    May a detailed message be left on voicemail: yes    Reason for Call: Other: Cele calling to request a call back . She states pt needs a new wheel chair. Please call her back to discuss.      Action Taken: Message routed to:  [facility] shabnam neuro

## 2019-08-27 NOTE — TELEPHONE ENCOUNTER
Spoke with patient, Ahsan, and she had gone to seating clinic on 6/11/19 for assessment for a new power wheelchair with Renetta Graham OT. Patient has not heard about the status of the wheelchair. She was given number of [facility] to get update.

## 2019-09-19 ENCOUNTER — OFFICE VISIT (OUTPATIENT)
Dept: PHYSICAL MEDICINE AND REHAB | Facility: CLINIC | Age: 74
End: 2019-09-19
Payer: COMMERCIAL

## 2019-09-19 VITALS — OXYGEN SATURATION: 99 % | SYSTOLIC BLOOD PRESSURE: 143 MMHG | DIASTOLIC BLOOD PRESSURE: 59 MMHG | HEART RATE: 70 BPM

## 2019-09-19 DIAGNOSIS — M62.838 MUSCLE SPASM: ICD-10-CM

## 2019-09-19 DIAGNOSIS — G80.0 SPASTIC QUADRIPLEGIC CEREBRAL PALSY (H): Primary | ICD-10-CM

## 2019-09-19 DIAGNOSIS — I89.0 LYMPHEDEMA: ICD-10-CM

## 2019-09-19 ASSESSMENT — PAIN SCALES - GENERAL: PAINLEVEL: MODERATE PAIN (5)

## 2019-09-19 ASSESSMENT — PATIENT HEALTH QUESTIONNAIRE - PHQ9: SUM OF ALL RESPONSES TO PHQ QUESTIONS 1-9: 2

## 2019-09-19 NOTE — NURSING NOTE
Chief Complaint   Patient presents with     RECHECK     [facility] RETURN - FOLLOW UP FROM DR. MARTINEZ       Preventive Care:    Breast Cancer Screening: During our visit today, we discussed that it is recommended you receive breast cancer screening. Please call or make an appointment with your primary care provider to discuss this with them. You may also call the [facility] scheduling line ([phone]) to set up a mammography appointment at the Breast [facility] within the [facility].      Evaristo Guerrero, EMT

## 2019-09-19 NOTE — LETTER
9/19/2019       RE: Benji Shankar  [address]  [place] [id number]     Dear Colleague,    Thank you for referring your patient, Benji Shankra, to the [facility] PHYSICAL MEDICINE AND REHABILITATION at [facility]. Please see a copy of my visit note below.    [facility]   PM&R clinic note      Interval history:     Benji Shankar presents to clinic today for follow up reg her rehab needs. She has h/o spastic diplegic CP and was last seen in clinic by Dr. Posey April 19, 2019.  Recommendations included referral for power WC seating eval due to poor-fitting chair, SW contact to help navigate [employer] Waiver Program, and psychology referral for increased anxiety.     Medical issues since last visit, ED visit for extropia of right eye, now improved.     Symptoms,   Spasticity in arms and legs are stable on current baclofen dose. She also has dull pain 'all over her body', sometimes sharp in nature. Pain typically occurs in her hands. Typically her pain does not bother her throughout the day. She has swelling in her legs she describes as unchanged for past few years. Her PCA is unable to find well-fitting shoes and requests prescription for diabetic shoes. Ahsan denies pain in her legs. She does still have sensation in her legs. Denies sores on feet or legs. Her PCA has noted some skin breakdown on her backside, no sores.     Therapies/HEP,   PT/OT were recommended at last clinic visit, but she has not completed. She expresses frustration about further therapies since the course is usually 1 month, her symptoms improve, then no further therapy is available and she regresses.  She has worked on mobility concerns with PT intermittently for at least the past several year. She states PT focused on stretching her legs. She had a home visit with a mental health provider several months ago for mood and anxiety  concerns, but didn't find much benefit. She opted for no further mental health visits. She is not concerned about her mood at today's visit.    Equipment,   She is awaiting for her new power WC. She completed WC eval with Renetta Graham OT on 6/11/19 and plan was to move forward with new power WC. Pt does not know status of WC and has yet to follow up. She uses a Cesilia Steady to transfer from bed to chair. She has b/l AFOs last used 2-3 years ago, not used now since she does not ambulate.      Functionally, she requires max assistance of caregiver for ADLs. Ahsan requires assistance with all transfers, showers and dressing. Sofiya her PCA works in her home for 8 hours per day (two 4-hour blocks, mornings and evenings) 7 days per week. Ahsan stays in her power chair while Sofiya is away. Regarding fine motor tasks, her left hand functions better than right hand. She is independent with food preparation and feeding herself. Per pt report, she has been non-ambulatory for about 10 years. Independent for IADLs.     Social history is unchanged. During the day, she spends time with her service dog, goes on shopping outings, and cooks dinner in the evening.       Medications:  Current Outpatient Medications   Medication Sig Dispense Refill     acetaminophen (ACETAMIN) 500 MG tablet Take 1-2 tablets by mouth every 6 hours as needed.       albuterol 90 MCG/ACT inhaler Inhale 2 puffs into the lungs every 6 hours as needed.       baclofen (LIORESAL) 10 MG tablet Take 2-3 tablets (20-30 mg) by mouth 3 times daily 810 tablet 3     beclomethasone (QVAR) 40 MCG/ACT inhaler Inhale 2 puffs into the lungs 2 times daily as needed.       BUPROPION HCL PO Take 150 mg by mouth daily       Calcium Carbonate-Vit D-Min (CALTRATE 600+D PLUS PO) Take 2 tablets by mouth daily.       captopril (CAPOTEN) 50 MG tablet Take  by mouth. 2 tablets in am and 1 tablet in pm       fluticasone (FLOVENT DISKUS) 100 MCG/BLIST AEPB Inhale 2 puffs into the  lungs every 12 hours       gabapentin 8 % GEL Apply 2-4 g topically 2 times daily as needed To bilateral feet 60 g 11     HYDROcodone-acetaminophen 5-325 MG per tablet Take 1 tablet by mouth every 4 hours as needed. 30 tablet 0     Multiple Vitamins-Minerals (CENTRUM SILVER) per tablet Take 1 tablet by mouth daily       omeprazole (PRILOSEC) 20 MG capsule Take 20 mg by mouth 2 times daily.       sertraline (ZOLOFT) 100 MG tablet Take 100 mg by mouth daily.       triamterene-hydrochlorothiazide (MAXZIDE-25) 37.5-25 MG per tablet Take 1 tablet by mouth daily.            Physical Exam:   BP (!) 143/59 (BP Location: Left arm, Patient Position: Sitting, Cuff Size: Adult Large)   Pulse 70   SpO2 99%     Gen: NAD, pleasant and cooperative, sitting comfortably in power WC  HEENT: normocephalic, without obvious abnormality, atraumatic. B/l hearing aids in place.  Cardio: regular pulse  Pulm: non-labored breathing in room air  Abd: benign  Ext: WWP, 2+ edema in BLE, no tenderness in calves, feet without lesions, left great toe with medial deviation  Neuro/MSK: A&O x3, follows all commands. Right hand rests in clenched fist position. Full active ROM hands and fingers.  Sensation intact to light touch b/l UEs and LEs. Strength antigravity b/l UEs and LEs except dorsal flexion. Increased tone and known contracture at bilateral knees. Speech fluent and appropriate fund of knowledge. Full-range affect.     Labs/Imaging:  Lab Results   Component Value Date    WBC 7.2 01/24/2018    HGB 14.8 01/24/2018    HCT 44.0 01/24/2018    MCV 90 01/24/2018     01/24/2018     Lab Results   Component Value Date     01/24/2018    POTASSIUM 3.2 (L) 01/24/2018    CHLORIDE 104 01/24/2018    CO2 27 01/24/2018    GLC 85 01/24/2018     Lab Results   Component Value Date    GFRESTIMATED 86 01/24/2018    GFRESTBLACK >90 01/24/2018     Lab Results   Component Value Date    AST 14 01/24/2018    ALT 22 01/24/2018    ALKPHOS 73 01/24/2018     "BILITOTAL 0.3 01/24/2018     No results found for: INR  Lab Results   Component Value Date    BUN 26 01/24/2018    CR 0.67 01/24/2018              Assessment/Plan   Benji Shankar, \"Ahsan\" is a 73 yo female with spastic diplegic cerebral palsy who was previously followed by Dr. Posey. Her spasticity has been well-controlled on current regimen. Medically, her b/l LE edema is chronic and seems to be due to her immobility. Functionally, she continues to have concern over her slow decline in mobility and ability to complete ADLs, as she is now completely dependent for transfers. We had a long discussion regarding her history of strength and functional gains/regressions from intermittent PT. She agrees that establishment of an ongoing PT regimen may help with her mobility and endurance and is motivated to try home PT.     1. Work-up: none  2. Therapy/equipment/braces: assessed for new power WC on 6/11/19 and provided pt with contact information with instructions to f/u with Renetta Graham OT. Home PT recommended for strength, endurance and home exercise plan.   3. Medications: continue current baclofen dose, may take 1-2 additional doses as needed for worsening spasms  4. Interventions: None  5. Referral / follow up with other providers: lymphedema clinic for b/l edema management, based on their recs, will order shoes. PCP regarding health maintenance and screening including vitamin D and bone density.  6. Follow up: return to clinic in 4-6 months or sooner if needed    Makcenzie Espinosa, MS4, [employer].     Physician Attestation   I, Makenzie Gaspar, was present with the medical student who participated in the service and in the documentation of the note.  I have verified the history and personally performed the physical exam and medical decision making.  I agree with the assessment and plan of care as documented in the note.      I personally reviewed vital signs and " "medications.    Ahsan is overall doing well. Lymphedema seems to be her primary issue at this point as well as chronic deconditioning and gradual decline in her function. Discussed CP and aging, role of PT and the importance of a regular HEP. She admits that she has \"not followed through\" in the past. Reviewed the plan as outlined above. Home PT given her homebound status, lymphedema clinic referral for better management of her lymphedema, and seating eval (changed the location to [facility] per [facility]'s recommendations). Should f/u with PCP for evaluation of bone density and routine screening. Will f/u in 4-6 months.    Makenzie Gaspar MD  Date of Service (when I saw the patient): 9/19/19             "

## 2019-09-19 NOTE — PATIENT INSTRUCTIONS
1. Spasticity: Continue taking 10 mg baclofen, 2 tablets by mouth twice daily. Okay to take 1-2 additional doses if spasms are more severe.    2. Will send you to Lymphedema Clinic to evaluate leg swelling. Based on their recommendations, will order shoes.    3. Please call Renetta Graham OT with the following contact information regarding the status of your power wheelchair.    [facility] [place] [facility] [place]  [address]  [address] [place] [address]  First Appointment:  [phone]  Clinic Information:  [phone]    4. We will follow-up with your primary care care provider regarding health screening exams which may be due and follow-up with you.     5. Home PT to work on strength, endurance, and establish home exercise program. Continue recommended exercises.     6. Return to clinic in 4-6 months, or sooner if needed.

## 2019-09-20 ENCOUNTER — PATIENT OUTREACH (OUTPATIENT)
Dept: PHYSICAL MEDICINE AND REHAB | Facility: CLINIC | Age: 74
End: 2019-09-20

## 2019-09-24 ENCOUNTER — TELEPHONE (OUTPATIENT)
Dept: PHYSICAL MEDICINE AND REHAB | Facility: CLINIC | Age: 74
End: 2019-09-24

## 2019-09-24 NOTE — TELEPHONE ENCOUNTER
[facility] Call Center    Phone Message    May a detailed message be left on voicemail: yes    Reason for Call: Other: Pt requesting referral/ order for a power wheelchair faxed over to [facility], [phone]     Action Taken: Message routed to:  [facility] PMR

## 2019-09-24 NOTE — TELEPHONE ENCOUNTER
Patient was called and informed that the orders were faxed to that number at [facility] last week. The orders were refaxed today.

## 2019-09-26 ENCOUNTER — MEDICAL CORRESPONDENCE (OUTPATIENT)
Dept: HEALTH INFORMATION MANAGEMENT | Facility: CLINIC | Age: 74
End: 2019-09-26

## 2019-10-03 ENCOUNTER — HOSPITAL ENCOUNTER (OUTPATIENT)
Dept: PHYSICAL THERAPY | Facility: CLINIC | Age: 74
Setting detail: THERAPIES SERIES
End: 2019-10-03
Attending: PHYSICAL MEDICINE & REHABILITATION
Payer: COMMERCIAL

## 2019-10-03 DIAGNOSIS — I89.0 LYMPHEDEMA: ICD-10-CM

## 2019-10-03 DIAGNOSIS — G80.0 SPASTIC QUADRIPLEGIC CEREBRAL PALSY (H): Primary | ICD-10-CM

## 2019-10-03 DIAGNOSIS — G80.0 SPASTIC QUADRIPLEGIC CEREBRAL PALSY (H): ICD-10-CM

## 2019-10-03 PROCEDURE — 97161 PT EVAL LOW COMPLEX 20 MIN: CPT | Mod: GP,ZF | Performed by: PHYSICAL THERAPIST

## 2019-10-03 PROCEDURE — 97140 MANUAL THERAPY 1/> REGIONS: CPT | Mod: GP,ZF | Performed by: PHYSICAL THERAPIST

## 2019-10-03 NOTE — PROGRESS NOTES
"   10/03/19 1100   Rehab Discipline   Discipline PT   Type of Visit   Type of visit Initial Edema Evaluation       present No   General Information   Start of care 10/03/19   Referring physician Dr. 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  She spends much of her day in her power chair.    Surgical / medical history reviewed Yes   Prior level of functional mobility Has not walked for about 10 years, at that time was using loftstrand crutches.  She reports only transferring with help from her PCA   Prior treatment Elevation   Community support Personal care attendant   Patient role / employment history Retired;Disabled   Living environment Apartment / condo   Living environment comments Pt lives in independently living with help of PCA 4hours in AM and 4 hours in PM   Current assistive devices Power wheel chair   Fall Risk Screen   Fall screen completed by PT   Have you fallen 2 or more times in the past year? No   Have you fallen and had an injury in the past year? No   Is patient a fall risk? No   Fall screen comments Pt not walking   Subjective Report   Patient report of symptoms My leg swelling doesn't bother me, it's always been there.   Patient / Family Goals   Patient / family goals statement No concerns, does not feel need to address the edema   Pain   Pain comments No pain in the legs from swelling   Cognitive Status   Orientation Orientation to person, place and time   Level of consciousness Alert   Follows commands and answers questions 100% of the time   Personal safety and judgement Intact   Memory Intact   Edema Exam / Assessment   Skin condition " Intact;Dryness   Skin condition comments Pt with mild 1+ pitting at dorsum of feet, toes swollen   Capillary refill comments Pt's feel and toes are blue, fast capillar refill   Stemmer sign Positive   Stemmer sign comments B 2nd toe   Ulceration No   Girth Measurements   Girth Measurements Refer to separate girth measurement flowsheet   Volume LE   Right LE (mL) 981mL  (10-30cm above heel)   Left LE (mL) 935mL   Range of Motion   ROM comments PT with decreased ROM, does some stretches, not affected by lymphedema   Strength   Strength comments Pt with decreased LE strength impairing muscle pump in calf.   Posture   Posture comments Pt evaluated with WC, flexed foward   Activities of Daily Living   Activities of Daily Living assist from PCA   Bed Mobility   Bed mobility assist from PCA   Transfers   Transfers assist from PCA for stand/squat pivot   Gait / Locomotion   Gait / Locomotion independent in power chair   Sensory   Sensory perception comments Pt reports intact   Muscle Tone   Muscle tone comments spaticity present, uses baclofen   Planned Edema Interventions   Planned edema interventions Education;Skin care / precautions   Clinical Impression   Criteria for skilled therapeutic intervention met Yes   Therapy diagnosis edema   Influenced by the following impairments / conditions Stage 1;Edema   Functional limitations due to impairments / conditions Pt reports none but risk of worsening and infection   Clinical Presentation Stable/Uncomplicated   Clinical Presentation Rationale no changes over many years   Clinical Decision Making (Complexity) Low complexity   Treatment Frequency Other (see comments)   Treatment duration 1 time eval and treat   Patient / family and/or staff in agreement with plan of care Yes   Risks and benefits of therapy have been explained Yes   Clinical impression comments Pt arrived for fitting for shoes, she was very upset and disappointed to be at a different clinic. She was clear that  she was not interested in any compression that was offered but was appreciative of education provided today.    Education Assessment   Preferred learning style Listening   Barriers to learning No barriers   Goals   Edema Eval Goals 1   Goal 1   Goal identifier Risk Reducation   Goal description Pt will report understanding of risk reduction for infection and exacerbation of swelling including but not limited to good skin care, position, avoiding extremes of temperature, constrictiong cltoing and salt.     Target date 10/03/19   Date met 10/03/19   Total Evaluation Time   PT Eval, Low Complexity Minutes (04688) 14

## 2019-10-03 NOTE — PROGRESS NOTES
Outpatient Physical Therapy Discharge Note     Patient: Benji Shankar  : 1945    Beginning/End Dates of Reporting Period:   10/3/2019    Referring Provider: Dr. Makenzie Gaspar    Therapy Diagnosis: Edema     Client Self Report:  Came to get soft shoes, her swelling is always present but not a problem    Goals:  Goal Identifier Risk Reducation   Goal Description Pt will report understanding of risk reduction for infection and exacerbation of swelling including but not limited to good skin care, position, avoiding extremes of temperature, constrictiong cltoing and salt.     Target Date 10/03/19   Date Met  10/03/19   Progress:     Progress Toward Goals:   Pt met above goal    Plan:  Discharge from therapy.    Discharge:    Reason for Discharge: Patient has met all goals.  Patient chooses to discontinue therapy.    Equipment Issued: None    Discharge Plan: Note sent to MD about pt's request for getting shoes

## 2019-11-26 ENCOUNTER — MEDICAL CORRESPONDENCE (OUTPATIENT)
Dept: HEALTH INFORMATION MANAGEMENT | Facility: CLINIC | Age: 74
End: 2019-11-26

## 2019-11-26 ENCOUNTER — TRANSFERRED RECORDS (OUTPATIENT)
Dept: HEALTH INFORMATION MANAGEMENT | Facility: CLINIC | Age: 74
End: 2019-11-26

## 2019-12-19 ENCOUNTER — TELEPHONE (OUTPATIENT)
Dept: PHYSICAL MEDICINE AND REHAB | Facility: CLINIC | Age: 74
End: 2019-12-19

## 2019-12-19 NOTE — TELEPHONE ENCOUNTER
VM left asking for a return call.     The patient has an appointment scheduled for March 5, 2020 with Dr. Gaspar. This appointment needs to be rescheduled due to the provider being out of the office.

## 2019-12-23 NOTE — TELEPHONE ENCOUNTER
Appointment rescheduled with the patient. Letter sent with new appointment time/date information.

## 2019-12-30 ENCOUNTER — TELEPHONE (OUTPATIENT)
Dept: PHYSICAL MEDICINE AND REHAB | Facility: CLINIC | Age: 74
End: 2019-12-30

## 2019-12-30 NOTE — TELEPHONE ENCOUNTER
[facility] Call Center    Phone Message    May a detailed message be left on voicemail: yes    Reason for Call: Other: Phillip from [employer] called in wondering if we recieved their 9 page fax for the patients wheelchair, please call back to update if recieved      Action Taken: Message routed to:  [facility] flora

## 2019-12-31 NOTE — TELEPHONE ENCOUNTER
[facility]  Discharge Summary  [facility]      Patient Name:  Santi Boggs  MRN: [id number]  Admission Date: 2018    Subjective:     Delivery Date: 2018   Delivery Time: 7:45 AM   Delivery Type: , Low Transverse     Maternal History:   Santi Boggs is a 3 days day old 35w2d   born to a mother who is a 35 y.o.   . She has a past medical history of Scoliosis and Thyroid disease. .     Prenatal Labs Review:  ABO/Rh:   Lab Results   Component Value Date/Time    GROUPTRH A POS 2018 06:45 AM     Group B Beta Strep:   Lab Results   Component Value Date/Time    STREPBCULT No Group B Streptococcus isolated 2017 11:29 AM     HIV: 2018: HIV 1/2 Ag/Ab Negative (Ref range: Negative)  RPR:   Lab Results   Component Value Date/Time    RPR Non-reactive 2018 12:19 PM     Hepatitis B Surface Antigen:   Lab Results   Component Value Date/Time    HEPBSAG Negative 10/19/2017 12:28 PM     Rubella Immune Status:   Lab Results   Component Value Date/Time    RUBELLAIMMUN Reactive 10/19/2017 12:28 PM       Pregnancy/Delivery Course (synopsis of major diagnoses, care, treatment, and services provided during the course of the hospital stay):    The pregnancy was complicated by mild gestational thrombocytopenia, Placenta Previa, Family history of Congenital heart ds.. Prenatal ultrasound revealed normal anatomy, Echo normal as well. Prenatal care was good. Mother received no medications. Membranes ruptured at delivery       . The delivery was uncomplicated.. Apgar scores   [facility] Assessment:     1 Minute:   Skin color:     Muscle tone:     Heart rate:     Breathing:     Grimace:     Total:  9          5 Minute:   Skin color:     Muscle tone:     Heart rate:     Breathing:     Grimace:     Total:  9          10 Minute:   Skin color:     Muscle tone:     Heart rate:     Breathing:     Grimace:     Total:           Living Status:       .    Review of  Returned call to Phillip at [facility] and informed him that we did receive the fax and that Dr Gaspar will not be in clinic until 1/9/20 and that she will sign the forms when she returns and they will be faxed back to him at that time.   "Systems    Objective:     Admission GA: 35w2d   Admission Weight: 2438 g (5 lb 6 oz) (Filed from Delivery Summary)  Admission  Head Circumference: 33 cm (Filed from Delivery Summary)   Admission Length: Height: 47 cm (18.5") (Filed from Delivery Summary)    Delivery Method: , Low Transverse       Feeding Method: Breastmilk and supplementing with formula for medical indication of hypoglycemia and     Labs:  Recent Results (from the past 168 hour(s))   Hemoglobin    Collection Time: 18  8:13 AM   Result Value Ref Range    Hemoglobin 15.5 13.5 - 19.5 g/dL   Hematocrit    Collection Time: 18  8:13 AM   Result Value Ref Range    Hematocrit 46.4 42.0 - 63.0 %   POCT glucose    Collection Time: 18  8:54 AM   Result Value Ref Range    POCT Glucose <20 (L) 70 - 110 mg/dL   POCT glucose    Collection Time: 18  9:46 AM   Result Value Ref Range    POCT Glucose 47 (LL) 70 - 110 mg/dL   POCT glucose    Collection Time: 18 12:55 PM   Result Value Ref Range    POCT Glucose 55 (L) 70 - 110 mg/dL   POCT glucose    Collection Time: 18  4:07 PM   Result Value Ref Range    POCT Glucose 44 (LL) 70 - 110 mg/dL   POCT glucose    Collection Time: 18  5:27 PM   Result Value Ref Range    POCT Glucose 49 (LL) 70 - 110 mg/dL   POCT glucose    Collection Time: 18  8:33 PM   Result Value Ref Range    POCT Glucose 46 (LL) 70 - 110 mg/dL   POCT glucose    Collection Time: 18 11:32 PM   Result Value Ref Range    POCT Glucose 44 (LL) 70 - 110 mg/dL   POCT glucose    Collection Time: 18 12:32 AM   Result Value Ref Range    POCT Glucose 49 (LL) 70 - 110 mg/dL   POCT glucose    Collection Time: 18  3:34 AM   Result Value Ref Range    POCT Glucose 42 (LL) 70 - 110 mg/dL   POCT glucose    Collection Time: 18  4:34 AM   Result Value Ref Range    POCT Glucose 59 (L) 70 - 110 mg/dL   POCT glucose    Collection Time: 18  7:41 AM   Result Value Ref Range    POCT " Glucose 34 (LL) 70 - 110 mg/dL   POCT glucose    Collection Time: 18  9:09 AM   Result Value Ref Range    POCT Glucose 74 70 - 110 mg/dL   Bilirubin, total    Collection Time: 18  9:37 AM   Result Value Ref Range    Total Bilirubin 6.0 0.1 - 6.0 mg/dL   Platelet count    Collection Time: 18  9:37 AM   Result Value Ref Range    Platelets 280 150 - 350 K/uL    MPV 10.1 9.2 - 12.9 fL   POCT glucose    Collection Time: 18 12:08 PM   Result Value Ref Range    POCT Glucose 59 (L) 70 - 110 mg/dL   POCT glucose    Collection Time: 18  4:53 PM   Result Value Ref Range    POCT Glucose 70 70 - 110 mg/dL   POCT glucose    Collection Time: 18  4:17 PM   Result Value Ref Range    POCT Glucose 60 (L) 70 - 110 mg/dL       There is no immunization history for the selected administration types on file for this patient.    Nursery Course (synopsis of major diagnoses, care, treatment, and services provided during the course of the hospital stay): hypoglycemia resolved after started supplementing with formula after each nursing.  Parents refused hepatitis B vaccine.     [place] Screen sent greater than 24 hours?: yes  Hearing Screen Right Ear: passed    Left Ear: passed   Stooling: Yes  Voiding: Yes  SpO2: Pre-Ductal (Right Hand): 100 %  SpO2: Post-Ductal: 100 %  Car Seat Test? Car Seat Testing Results: Pass  Therapeutic Interventions: none  Surgical Procedures: none    Discharge Exam:   Discharge Weight: Weight: 2305 g (5 lb 1.3 oz)  Weight Change Since Birth: -5%     Physical Exam   General Appearance:  Healthy-appearing, vigorous infant, , no dysmorphic features  Head:  Normocephalic, atraumatic, anterior fontanelle open soft and flat  Eyes:  PERRL, red reflex present bilaterally, anicteric sclera, no discharge  Ears:  Well-positioned, well-formed pinnae                             Nose:  nares patent, no rhinorrhea  Throat:  oropharynx clear, non-erythematous, mucous membranes moist, palate  intact  Neck:  Supple, symmetrical, no torticollis  Chest:  Lungs clear to auscultation, respirations unlabored   Heart:  Regular rate & rhythm, normal S1/S2, no murmurs, rubs, or gallops  Abdomen:  positive bowel sounds, soft, non-tender, non-distended, no masses, umbilical stump clean  Pulses:  Strong equal femoral and brachial pulses, brisk capillary refill  Hips:  Negative Sin & Ortolani, gluteal creases equal  :  Normal Gabe I female genitalia, anus patent  Musculosketal: no wiliam or dimples, no scoliosis or masses, clavicles intact  Extremities:  Well-perfused, warm and dry, no cyanosis  Skin: no rashes, no jaundice  Neuro:  strong cry, good symmetric tone and strength; positive archana, root and suck      Assessment and Plan:     Discharge Date and Time: No discharge date for patient encounter.    Final Diagnoses:   Final Active Diagnoses:    Diagnosis Date Noted POA    Immunization not carried out because of parent refusal [Z28.82] 2018 No    Single liveborn, born in hospital, delivered by  delivery [Z38.01] 2018 Yes    Premature infant of 35 weeks gestation [P07.38]  Yes      Problems Resolved During this Admission:    Diagnosis Date Noted Date Resolved POA   , AGA  Hypoglycemia resolved after supplementation with formula    Discharged Condition: Good    Disposition: Discharge to Home    Follow Up:[facility] in 1-2 days.    Patient Instructions:   No discharge procedures on file.  Medications:  Reconciled Home Medications: There are no discharge medications for this patient.    Special Instructions: Anticipatory care: safety, feedings, immunizations, illness, car seat, limit visitors and and exposure to crowds.  Advised against co-sleeping with infant  Back to sleep in bassinet, crib, or pack and play.  Office hours, emergency numbers and contact information discussed with parents  Follow up for fever of 100.4 or greater, lethargy, or bilious emesis.       Archana WILSON  DO Bean  Pediatrics  [facility]

## 2020-01-09 ENCOUNTER — MEDICAL CORRESPONDENCE (OUTPATIENT)
Dept: HEALTH INFORMATION MANAGEMENT | Facility: CLINIC | Age: 75
End: 2020-01-09

## 2020-01-13 ENCOUNTER — DOCUMENTATION ONLY (OUTPATIENT)
Dept: PHYSICAL MEDICINE AND REHAB | Facility: CLINIC | Age: 75
End: 2020-01-13

## 2020-01-27 ENCOUNTER — DOCUMENTATION ONLY (OUTPATIENT)
Dept: PHYSICAL MEDICINE AND REHAB | Facility: CLINIC | Age: 75
End: 2020-01-27

## 2020-01-27 NOTE — PROGRESS NOTES
Received approval letter from [facility] regarding the power wheelchair. Letter sent to scan.

## 2020-11-02 ENCOUNTER — OFFICE VISIT (OUTPATIENT)
Dept: PHYSICAL MEDICINE AND REHAB | Facility: CLINIC | Age: 75
End: 2020-11-02
Payer: COMMERCIAL

## 2020-11-02 VITALS
TEMPERATURE: 97.8 F | HEART RATE: 83 BPM | RESPIRATION RATE: 16 BRPM | OXYGEN SATURATION: 98 % | SYSTOLIC BLOOD PRESSURE: 134 MMHG | DIASTOLIC BLOOD PRESSURE: 85 MMHG

## 2020-11-02 DIAGNOSIS — F32.5 MAJOR DEPRESSIVE DISORDER IN REMISSION, UNSPECIFIED WHETHER RECURRENT (H): ICD-10-CM

## 2020-11-02 DIAGNOSIS — G80.0 SPASTIC QUADRIPLEGIC CEREBRAL PALSY (H): ICD-10-CM

## 2020-11-02 DIAGNOSIS — L89.159 PRESSURE INJURY OF SKIN OF SACRAL REGION, UNSPECIFIED INJURY STAGE: Primary | ICD-10-CM

## 2020-11-02 DIAGNOSIS — R41.89 COGNITIVE DEFICITS: ICD-10-CM

## 2020-11-02 DIAGNOSIS — L98.9 SKIN LESION: ICD-10-CM

## 2020-11-02 PROCEDURE — 99214 OFFICE O/P EST MOD 30 MIN: CPT | Performed by: PHYSICAL MEDICINE & REHABILITATION

## 2020-11-02 NOTE — PROGRESS NOTES
Wound [facility]   PM&R clinic note        Interval history:     Benji Shankar presents to clinic today for follow up reg her rehab needs. She has h/o spastic diplegic CP and was followed by Dr. Posey. I saw her last 9/2019.      Medical issues since last visit,   No ED visit or hospitalization.     Symptoms,   Spasticity in arms and legs are stable on current baclofen dose. She also has dull pain 'all over her body', sometimes sharp in nature. Pain typically occurs in her hands. Typically her pain does not bother her throughout the day. She had swelling in her legs which has actually improved since last visit. Her PCA has been very helpful with applying compression socks for her every day and they have not had any issues finding shoes as they did before. Ahsan denies pain in her legs. She has intact sensation in her legs.     I received a note from her PCA and called her during our visit. She was concerned about a few skin lesions in her legs which occurred suddenly last spring and have been persistently present since then. They are not itchy or painful but bleed during shower. Sofiya, Ahsan's PCA, has used multiple different types of ointments including moisturizing cream, neomycin and etc with no improvement.   Sofiya was also worried about some skin discoloration / redness at her sacral area. No open wounds but definitely a pressure injury. They have tried 7-8 different types of cushion but no change in her skin color/redness. Ahsan can't change position in bed or in her chair when she is alone at home. She has a sleep number bed/mattress and can move the head of the bed.     Sofiya has noted some worsening in Ahsan's memory recently.     Therapies/HEP,   No active therapies. She does some stretching exercises on a regular basis.     Equipment,   She got her new power [employer]ress through [employer] which is fitting well, other than her issue with pressure sacral wound and  cushion. She uses a Cesilia Steady to transfer from bed to chair. She has b/l AFOs last used 2-3 years ago, not used now since she does not ambulate.      Functionally, she requires max assistance of caregiver for ADLs. Ahsan requires assistance with all transfers, showers and dressing. Sofiya her PCA works in her home for 8 hours per day (two 4-hour blocks, mornings and evenings) 7 days per week. Ahsan stays in her power chair while Sofiya is away. Regarding fine motor tasks, her left hand functions better than right hand. She is independent with food preparation and feeding herself. Per pt report, she has been non-ambulatory for about 10 years.   Sofiya also helps with medication management.     Social history is unchanged. During the day, she spends time with her service dog, goes on shopping outings, and cooks dinner in the evening.       Medications:  Current Outpatient Medications   Medication Sig Dispense Refill     acetaminophen (ACETAMIN) 500 MG tablet Take 1-2 tablets by mouth every 6 hours as needed.       albuterol 90 MCG/ACT inhaler Inhale 2 puffs into the lungs every 6 hours as needed.       baclofen (LIORESAL) 10 MG tablet Take 2-3 tablets (20-30 mg) by mouth 3 times daily 810 tablet 3     beclomethasone (QVAR) 40 MCG/ACT inhaler Inhale 2 puffs into the lungs 2 times daily as needed.       BUPROPION HCL PO Take 150 mg by mouth daily       Calcium Carbonate-Vit D-Min (CALTRATE 600+D PLUS PO) Take 2 tablets by mouth daily.       captopril (CAPOTEN) 50 MG tablet Take  by mouth. 2 tablets in am and 1 tablet in pm       fluticasone (FLOVENT DISKUS) 100 MCG/BLIST AEPB Inhale 2 puffs into the lungs every 12 hours       gabapentin 8 % GEL Apply 2-4 g topically 2 times daily as needed To bilateral feet 60 g 11     HYDROcodone-acetaminophen 5-325 MG per tablet Take 1 tablet by mouth every 4 hours as needed. 30 tablet 0     Multiple Vitamins-Minerals (CENTRUM SILVER) per tablet Take 1 tablet by mouth daily        omeprazole (PRILOSEC) 20 MG capsule Take 20 mg by mouth 2 times daily.       sertraline (ZOLOFT) 100 MG tablet Take 100 mg by mouth daily.       triamterene-hydrochlorothiazide (MAXZIDE-25) 37.5-25 MG per tablet Take 1 tablet by mouth daily.                Physical Exam:   /85   Pulse 83   Temp 97.8  F (36.6  C)   Resp 16   SpO2 98%     Gen: NAD, pleasant and cooperative, sitting comfortably in power WC  HEENT: b/l hearing aids in place.  Cardio: regular pulse  Pulm: non-labored breathing in room air  Abd: benign  Ext: WWP, trace edema in BLE, no tenderness in calves, multiple skin discolorations at the medial side of both legs, no open area. Some thickening / scar tissue at the site of discolorations.   Neuro/MSK: A&O x3, follows all commands. Right hand rests in clenched fist position. Full active ROM hands and fingers. Slightly limited b/l shoulder active ROM. Sensation intact to light touch b/l UEs and LEs. Strength 4/5 at bilateral shoulder abd and 4+/5 distally at bilateral upper extremities. HF 3/5 b/l, KF/KE 4/5 within limited range and no volitional DF/EHL. Increased tone and known contracture at bilateral knees. Speech fluent and appropriate fund of knowledge. Full-range affect.       Labs/Imaging:  Lab Results   Component Value Date    WBC 7.2 01/24/2018    HGB 14.8 01/24/2018    HCT 44.0 01/24/2018    MCV 90 01/24/2018     01/24/2018     Lab Results   Component Value Date     01/24/2018    POTASSIUM 3.2 (L) 01/24/2018    CHLORIDE 104 01/24/2018    CO2 27 01/24/2018    GLC 85 01/24/2018     Lab Results   Component Value Date    GFRESTIMATED 86 01/24/2018    GFRESTBLACK >90 01/24/2018     Lab Results   Component Value Date    AST 14 01/24/2018    ALT 22 01/24/2018    ALKPHOS 73 01/24/2018    BILITOTAL 0.3 01/24/2018     No results found for: INR  Lab Results   Component Value Date    BUN 26 01/24/2018    CR 0.67 01/24/2018              Assessment/Plan       Spastic diplegic cerebral  palsy     Sacral pressure injury     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 Contractures are chronic and not worsening. She is deconditioned and has very limited mobility. We discussed that the best management and prevention for pressure injuries are frequent repositioning which she can't do. May be special mattress and custom fir cushion. Will send referral to wound nurse for more evaluation (home care wound nurse), then consider referral to seating clinic at [place].     Unclear etiology for her skin lesions in legs; will send dermatology referral given their concerns with bleeding during shower.     Also sent a referral for home SLP for more evaluation of her cognitive deficits.       1. Work-up: none    2. Therapy/equipment/braces: home SLP for more evaluation of her cognitive deficits.     3. Medications: continue current baclofen dose, may take 1-2 additional doses as needed for worsening spasms    4. Interventions: none    5. Referral / follow up with other providers: sent a referral to home wound care nurse for more evaluation of her sacral pressure injury. Consider new referral to seating clinic at [place] for a custom cushion. Consider referral to bone health clinic with Dr. Castanon (I will send a message to her PCP to discuss this plan). New referral to dermatology clinic for evaluation of wounds / skin discolorations in her legs.     6. Follow up: return to clinic in one year or sooner if needed       Makenzie Gaspar MD  Physical Medicine & Rehabilitation     Addendum 11/17/20   Sent a new referral to [facility] for customized wheelchair seating evaluation

## 2020-11-02 NOTE — LETTER
11/2/2020     RE: Benji Shankar  [address]  [address]  [place] [id number]     Dear Colleague,    Thank you for referring your patient, Benji Shankar, to the [facility]ICINE [facility]IL[facility] [place] at [facility]. Please see a copy of my visit note below.    Wound [facility]   PM&R clinic note        Interval history:     Benji Shankar presents to clinic today for follow up reg her rehab needs. She has h/o spastic diplegic CP and was followed by Dr. Posey. I saw her last 9/2019.      Medical issues since last visit,   No ED visit or hospitalization.     Symptoms,   Spasticity in arms and legs are stable on current baclofen dose. She also has dull pain 'all over her body', sometimes sharp in nature. Pain typically occurs in her hands. Typically her pain does not bother her throughout the day. She had swelling in her legs which has actually improved since last visit. Her PCA has been very helpful with applying compression socks for her every day and they have not had any issues finding shoes as they did before. Ahsan denies pain in her legs. She has intact sensation in her legs.     I received a note from her PCA and called her during our visit. She was concerned about a few skin lesions in her legs which occurred suddenly last spring and have been persistently present since then. They are not itchy or painful but bleed during shower. Sofiya, Ahsan's PCA, has used multiple different types of ointments including moisturizing cream, neomycin and etc with no improvement.   Sofiya was also worried about some skin discoloration / redness at her sacral area. No open wounds but definitely a pressure injury. They have tried 7-8 different types of cushion but no change in her skin color/redness. Ahsan can't change position in bed or in her chair when she is alone at home. She has a  sleep number bed/mattress and can move the head of the bed.     Sofiya has noted some worsening in Ahsan's memory recently.     Therapies/HEP,   No active therapies. She does some stretching exercises on a regular basis.     Equipment,   She got her new power WC through [employer] which is fitting well, other than her issue with pressure sacral wound and cushion. She uses a Cesilia Steady to transfer from bed to chair. She has b/l AFOs last used 2-3 years ago, not used now since she does not ambulate.      Functionally, she requires max assistance of caregiver for ADLs. Ahsan requires assistance with all transfers, showers and dressing. Sofiya her PCA works in her home for 8 hours per day (two 4-hour blocks, mornings and evenings) 7 days per week. Ahsan stays in her power chair while Sofiya is away. Regarding fine motor tasks, her left hand functions better than right hand. She is independent with food preparation and feeding herself. Per pt report, she has been non-ambulatory for about 10 years.   Sofiya also helps with medication management.     Social history is unchanged. During the day, she spends time with her service dog, goes on shopping outings, and cooks dinner in the evening.       Medications:  Current Outpatient Medications   Medication Sig Dispense Refill     acetaminophen (ACETAMIN) 500 MG tablet Take 1-2 tablets by mouth every 6 hours as needed.       albuterol 90 MCG/ACT inhaler Inhale 2 puffs into the lungs every 6 hours as needed.       baclofen (LIORESAL) 10 MG tablet Take 2-3 tablets (20-30 mg) by mouth 3 times daily 810 tablet 3     beclomethasone (QVAR) 40 MCG/ACT inhaler Inhale 2 puffs into the lungs 2 times daily as needed.       BUPROPION HCL PO Take 150 mg by mouth daily       Calcium Carbonate-Vit D-Min (CALTRATE 600+D PLUS PO) Take 2 tablets by mouth daily.       captopril (CAPOTEN) 50 MG tablet Take  by mouth. 2 tablets in am and 1 tablet in pm       fluticasone (FLOVENT DISKUS) 100 MCG/BLIST  AEPB Inhale 2 puffs into the lungs every 12 hours       gabapentin 8 % GEL Apply 2-4 g topically 2 times daily as needed To bilateral feet 60 g 11     HYDROcodone-acetaminophen 5-325 MG per tablet Take 1 tablet by mouth every 4 hours as needed. 30 tablet 0     Multiple Vitamins-Minerals (CENTRUM SILVER) per tablet Take 1 tablet by mouth daily       omeprazole (PRILOSEC) 20 MG capsule Take 20 mg by mouth 2 times daily.       sertraline (ZOLOFT) 100 MG tablet Take 100 mg by mouth daily.       triamterene-hydrochlorothiazide (MAXZIDE-25) 37.5-25 MG per tablet Take 1 tablet by mouth daily.                Physical Exam:   /85   Pulse 83   Temp 97.8  F (36.6  C)   Resp 16   SpO2 98%     Gen: NAD, pleasant and cooperative, sitting comfortably in power WC  HEENT: b/l hearing aids in place.  Cardio: regular pulse  Pulm: non-labored breathing in room air  Abd: benign  Ext: WWP, trace edema in BLE, no tenderness in calves, multiple skin discolorations at the medial side of both legs, no open area. Some thickening / scar tissue at the site of discolorations.   Neuro/MSK: A&O x3, follows all commands. Right hand rests in clenched fist position. Full active ROM hands and fingers. Slightly limited b/l shoulder active ROM. Sensation intact to light touch b/l UEs and LEs. Strength 4/5 at bilateral shoulder abd and 4+/5 distally at bilateral upper extremities. HF 3/5 b/l, KF/KE 4/5 within limited range and no volitional DF/EHL. Increased tone and known contracture at bilateral knees. Speech fluent and appropriate fund of knowledge. Full-range affect.       Labs/Imaging:  Lab Results   Component Value Date    WBC 7.2 01/24/2018    HGB 14.8 01/24/2018    HCT 44.0 01/24/2018    MCV 90 01/24/2018     01/24/2018     Lab Results   Component Value Date     01/24/2018    POTASSIUM 3.2 (L) 01/24/2018    CHLORIDE 104 01/24/2018    CO2 27 01/24/2018    GLC 85 01/24/2018     Lab Results   Component Value Date     GFRESTIMATED 86 01/24/2018    GFRESTBLACK >90 01/24/2018     Lab Results   Component Value Date    AST 14 01/24/2018    ALT 22 01/24/2018    ALKPHOS 73 01/24/2018    BILITOTAL 0.3 01/24/2018     No results found for: INR  Lab Results   Component Value Date    BUN 26 01/24/2018    CR 0.67 01/24/2018              Assessment/Plan       Spastic diplegic cerebral palsy     Sacral pressure injury     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 Contractures are chronic and not worsening. She is deconditioned and has very limited mobility. We discussed that the best management and prevention for pressure injuries are frequent repositioning which she can't do. May be special mattress and custom fir cushion. Will send referral to wound nurse for more evaluation (home care wound nurse), then consider referral to seating clinic at [place].     Unclear etiology for her skin lesions in legs; will send dermatology referral given their concerns with bleeding during shower.     Also sent a referral for home SLP for more evaluation of her cognitive deficits.       1. Work-up: none    2. Therapy/equipment/braces: home SLP for more evaluation of her cognitive deficits.     3. Medications: continue current baclofen dose, may take 1-2 additional doses as needed for worsening spasms    4. Interventions: none    5. Referral / follow up with other providers: sent a referral to home wound care nurse for more evaluation of her sacral pressure injury. Consider new referral to seating clinic at [place] for a custom cushion. Consider referral to bone health clinic with Dr. Castanon (I will send a message to her PCP to discuss this plan). New referral to dermatology clinic for evaluation of wounds / skin discolorations in her legs.     6. Follow up: return to clinic in one year or sooner if needed       Makenzie Gaspar MD  Physical Medicine &  Rehabilitation     Addendum 11/17/20

## 2020-11-17 ENCOUNTER — TELEPHONE (OUTPATIENT)
Dept: PHYSICAL MEDICINE AND REHAB | Facility: CLINIC | Age: 75
End: 2020-11-17

## 2020-11-17 NOTE — TELEPHONE ENCOUNTER
[facility] now requests orders and shares plan of care/discharge summaries for some patients through [employer].  Please REPLY TO THIS MESSAGE OR ROUTE BACK TO THE AUTHOR in order to give authorization for orders when needed.  This is considered a verbal order, you will still receive a faxed copy of orders for signature.  Thank you for your assistance in improving collaboration for our patients.    FYI: please send over fax to [phone] Attn: [facility] for customized wheelchair seating evaluation. Pt reports you discussed this with her at last visit. [facility] has not received these orders yet.     Ludy López OTR/L  Occupational Therapist  [phone]  [email or web address]

## 2020-11-20 ENCOUNTER — TELEPHONE (OUTPATIENT)
Dept: PHYSICAL MEDICINE AND REHAB | Facility: CLINIC | Age: 75
End: 2020-11-20

## 2020-11-20 NOTE — TELEPHONE ENCOUNTER
[facility] Call Center    Phone Message    May a detailed message be left on voicemail: yes     Reason for Call: Other: Evangelina calling to request a call back with clarification of the order. She is wondering if it is for a new wheelchair or a new seat. Please call her at your earliest convenience to discuss.     Action Taken: Message routed to:  [facility]    Travel Screening: Not Applicable

## 2020-11-23 NOTE — TELEPHONE ENCOUNTER
Evangelina was called at [facility] and informed that the order from Dr Gaspar was for a wheelchair cushion only.

## 2020-12-17 ENCOUNTER — OFFICE VISIT (OUTPATIENT)
Dept: DERMATOLOGY | Facility: CLINIC | Age: 75
End: 2020-12-17
Attending: PHYSICAL MEDICINE & REHABILITATION
Payer: COMMERCIAL

## 2020-12-17 DIAGNOSIS — L98.9 SKIN LESION: ICD-10-CM

## 2020-12-17 DIAGNOSIS — R23.8 PAPULES: Primary | ICD-10-CM

## 2020-12-17 DIAGNOSIS — G80.0 SPASTIC QUADRIPLEGIC CEREBRAL PALSY (H): ICD-10-CM

## 2020-12-17 PROCEDURE — 99202 OFFICE O/P NEW SF 15 MIN: CPT | Mod: GC | Performed by: DERMATOLOGY

## 2020-12-17 RX ORDER — CLOBETASOL PROPIONATE 0.5 MG/G
OINTMENT TOPICAL 2 TIMES DAILY
Qty: 60 G | Refills: 1 | Status: SHIPPED | OUTPATIENT
Start: 2020-12-17

## 2020-12-17 NOTE — NURSING NOTE
Dermatology Rooming Note    Benji Shankar's goals for this visit include:   Chief Complaint   Patient presents with     Derm Problem     Ahsan is here today for her ankles and her buttock     Althea Damian CMA

## 2020-12-17 NOTE — PATIENT INSTRUCTIONS
"1. Purple spots on legs  - we'd like to see if these spots are a local form of eczema  - please apply the clobetasol ointment twice a day to the spots on the lower legs   - if this doesn't work, then we'll consider a biopsy of them    Gentle Skin Care    1. Bathing tips    Daily bathing is recommended. Baths are better than showers as soaking can help hydrate the skin.    Avoid super hot water. Although it feels good with the cold weather, hot water can dry out the skin.    Use soap sparingly. Believe it or not, we only really need soap to wash the armpits and groin, or other areas that are visibly dirty.     Use gentle soap products such as Dove bar soap for sensitive skin. Vanicream, CeraVe and Cetaphil are also great brands for sensitive skin.    Do not vigorously scrub your skin during cleansing.     After bathing, pat your skin lightly with a towel. Do not rub or scrub when drying.     2. Moisturizing tips    Always apply a moisturizer immediately to the entire body after bathing. This helps to \"lock in\" the moisture. Moisturizer can be applied daily if necessary.     Moisturizers containing ceramides are better. Ceramides are natural lipids that our skin needs to stay healthy. We recommend Vanicream, CeraVe, Cetaphil Restoraderm, Eucerin Professional Repair or Curel Intensive Healing Cream.    Adding in a humidifier at night can be soothing.     3. Lifestyle tips    Avoid products such as powders, perfumes or colognes on your skin. These can cause further irritation in sensitive skin.    Avoid saunas and steam baths. The temperature is too hot.     Use unscented hypo-allergenic laundry products.    Avoid tight or \"scratchy\" clothing such as wool, as they can cause irritation and itching.    Below is a list of products our providers recommend for gentle skin care.  Moisturizers:    Lighter; Cetaphil Cream, CeraVe, Aveeno and Vanicream Light     Thicker; Aquaphor Ointment, Vaseline, Petrolium Jelly, Eucerin and " Vanicream    Avoid Lotions (too thin)  Mild Cleansers:    Dove- Fragrance Free    CeraVe     Vanicream Cleansing Bar    Cetaphil Cleanser     Aquaphor 2 in1 Gentle Wash and Shampoo       Laundry Products:    All Free and Clear    Cheer Free    Generic Brands are okay as long as they are  Fragrance Free      Avoid fabric softeners  and dryer sheets   Sunscreens: SPF 30 or greater     Sunscreens that contain Zinc Oxide or Titanium Dioxide should be applied, these are physical blockers. Spray or  chemical  sunscreens should be avoided.        Shampoo and Conditioners:    Free and Clear by Vanicream    Aquaphor 2 in 1 Gentle Wash and Shampoo    California Baby  super sensitive   Oils:    Mineral Oil     Emu Oil     For some patients, coconut and sunflower seed oil

## 2020-12-17 NOTE — PROGRESS NOTES
"[facility] Dermatology Note    Dermatology Problem List:    1. Violaceous papules on lower extremities  - Ddx: arthropod bites vs eczema vs hypertrophic LP vs vascular proliferation vs other  - Current Tx: clobetasol BID x4wks  2. Peripheral vascular disease    Encounter Date: Dec 17, 2020    CC:   \"I have these bug bites on my legs\"    History of Present Illness:  Ms. Benji Shankar is a 75 year old female with past dermatologic history listed above who presents as a referral from Dr. Gaspar for further evaluation of \"bug bites\" on her legs. Reports that back in May, she was on her front porch when her PCA found her with bites on her legs. Believed that they are due to bug bites. Denies any pruritus, but does endorse pain with palpation. Has not tried any topicals    Of note, has a history of poor circulation affecting her legs.     Past Medical History:   Patient Active Problem List   Diagnosis     Urge incontinence     Urgency of urination     Post-operative state     Cerebral palsy (H)     Abdominal bloating     Asthma     Dehydration     Depression     Dysphagia, unspecified type     Essential hypertension     Gastroesophageal reflux disease, esophagitis presence not specified     Hypokalemia     Mild intermittent asthma without complication     Nausea     Osteoporosis     Cerebral palsy, unspecified type (H)     Major depressive disorder in remission, unspecified whether recurrent (H)     Past Medical History:   Diagnosis Date     Asthma     ALLERGIC RHINITIS     Cerebral palsy (H)      Cerebral palsy with spastic/ataxic diplegia      Chronic bronchitis (H)      Depression      DJD (degenerative joint disease)      Gastro-oesophageal reflux disease      Hypertension      Osteopenia      Past Surgical History:   Procedure Laterality Date     ANKLE SURGERY       CARPAL TUNNEL RELEASE RT/LT       EYE SURGERY       HIP SURGERY       KNEE SURGERY       MAMMOPLASTY REDUCTION BILATERAL  " 11/29/2012    Procedure: MAMMOPLASTY REDUCTION BILATERAL;  BILATERAL BREAST REDUCTION MAMMOPLASTY;  Surgeon: Ely Bolton MD;  Location: [facility]       Social History:  Patient  reports that she has never smoked. She has never used smokeless tobacco. She reports current alcohol use. She reports that she does not use drugs.    Family History:  Family History   Problem Relation Age of Onset     Melanoma No family hx of      Skin Cancer No family hx of        Medications:  Current Outpatient Medications   Medication Sig Dispense Refill     acetaminophen (ACETAMIN) 500 MG tablet Take 1-2 tablets by mouth every 6 hours as needed.       albuterol 90 MCG/ACT inhaler Inhale 2 puffs into the lungs every 6 hours as needed.       baclofen (LIORESAL) 10 MG tablet Take 2-3 tablets (20-30 mg) by mouth 3 times daily 810 tablet 3     beclomethasone (QVAR) 40 MCG/ACT inhaler Inhale 2 puffs into the lungs 2 times daily as needed.       BUPROPION HCL PO Take 150 mg by mouth daily       Calcium Carbonate-Vit D-Min (CALTRATE 600+D PLUS PO) Take 2 tablets by mouth daily.       captopril (CAPOTEN) 50 MG tablet Take  by mouth. 2 tablets in am and 1 tablet in pm       fluticasone (FLOVENT DISKUS) 100 MCG/BLIST AEPB Inhale 2 puffs into the lungs every 12 hours       gabapentin 8 % GEL Apply 2-4 g topically 2 times daily as needed To bilateral feet 60 g 11     HYDROcodone-acetaminophen 5-325 MG per tablet Take 1 tablet by mouth every 4 hours as needed. 30 tablet 0     Multiple Vitamins-Minerals (CENTRUM SILVER) per tablet Take 1 tablet by mouth daily       omeprazole (PRILOSEC) 20 MG capsule Take 20 mg by mouth 2 times daily.       sertraline (ZOLOFT) 100 MG tablet Take 100 mg by mouth daily.       triamterene-hydrochlorothiazide (MAXZIDE-25) 37.5-25 MG per tablet Take 1 tablet by mouth daily.          Allergies   Allergen Reactions     Pollen Extract      Sulfa Drugs        Review of Systems:  - Skin/Heme New Pt: The patient denies  frequent sun exposure. The patient denies excessive scarring or problems healing except as per HPI. The patient denies excessive bleeding.  - Constitutional: Otherwise feeling well today, in usual state of health.  - Skin: As above in HPI. No additional skin concerns.    Physical exam:  Vitals: There were no vitals taken for this visit.  GEN: This is a well developed, well-nourished female in no acute distress, in a pleasant mood.    SKIN: Focused examination of the legs was performed.  - multiple violaceous/erythematous papules and plaques affecting the medial aspect of the lower extremities. Jakob with pressure  - No other lesions of concern on areas examined.                     In office labs or procedures performed today:   None    Impression/Plan:  1. Violaceous papules and plaques  - Based off H&P, DDx for condition is wide and includes: arthropod bite vs eczema vs early hypertrophic LP vs vascular proliferation (?[place]) vs other. Will do trial of clobetasol ointment for empiric therapy with gentle skin care and close follow up. If persistent, can consider 3mm punch biopsy at follow up but some concern about known peripheral vascular disease and poor wound healing from procedure.  - Prescribed clobetasol ointment BID to plaques   - Discussed that if ineffective, would consider biopsy of one of the lesions at follow up  - Provided information on gentle skin care  - Photodocumentation obtained in clinic    CC Makenzie Gaspar MD  [address] [place] [phone] on close of this encounter.    Follow-up in 4 weeks, earlier for new or changing lesions.     Staff Involved:  Patient was seen and staffed with attending physician Dr. Spencer Cooley MD  Med/Derm Resident PGY-3  P:[phone]    Staff Physician Comments:   I saw and evaluated the patient with the resident and I agree with the assessment and plan.  I was present for the examination.    Darian Palmer MD  Pronouns:  he/him/his    Department of Dermatology  [facility]: Phone: [phone], Fax:[phone]  [facility]: Phone: [phone] Fax: [phone]

## 2020-12-17 NOTE — LETTER
"12/17/2020       RE: Benji Shankar  [address]  [facility] [place] [facility] [address]     Dear Colleague,    Thank you for referring your patient, Benji Shankar, to the [facility] [place] at [facility]. Please see a copy of my visit note below.    [facility] Dermatology Note    Dermatology Problem List:    1. Violaceous papules on lower extremities  - Ddx: arthropod bites vs eczema vs hypertrophic LP vs vascular proliferation vs other  - Current Tx: clobetasol BID x4wks  2. Peripheral vascular disease    Encounter Date: Dec 17, 2020    CC:   \"I have these bug bites on my legs\"    History of Present Illness:  Ms. Benji Shankar is a 75 year old female with past dermatologic history listed above who presents as a referral from Dr. Gaspar for further evaluation of \"bug bites\" on her legs. Reports that back in May, she was on her front porch when her PCA found her with bites on her legs. Believed that they are due to bug bites. Denies any pruritus, but does endorse pain with palpation. Has not tried any topicals    Of note, has a history of poor circulation affecting her legs.     Past Medical History:   Patient Active Problem List   Diagnosis     Urge incontinence     Urgency of urination     Post-operative state     Cerebral palsy (H)     Abdominal bloating     Asthma     Dehydration     Depression     Dysphagia, unspecified type     Essential hypertension     Gastroesophageal reflux disease, esophagitis presence not specified     Hypokalemia     Mild intermittent asthma without complication     Nausea     Osteoporosis     Cerebral palsy, unspecified type (H)     Major depressive disorder in remission, unspecified whether recurrent (H)     Past Medical History:   Diagnosis Date     Asthma     ALLERGIC RHINITIS     Cerebral palsy (H)      Cerebral palsy with spastic/ataxic diplegia      Chronic " bronchitis (H)      Depression      DJD (degenerative joint disease)      Gastro-oesophageal reflux disease      Hypertension      Osteopenia      Past Surgical History:   Procedure Laterality Date     ANKLE SURGERY       CARPAL TUNNEL RELEASE RT/LT       EYE SURGERY       HIP SURGERY       KNEE SURGERY       MAMMOPLASTY REDUCTION BILATERAL  11/29/2012    Procedure: MAMMOPLASTY REDUCTION BILATERAL;  BILATERAL BREAST REDUCTION MAMMOPLASTY;  Surgeon: Ely Bolton MD;  Location: [facility]       Social History:  Patient  reports that she has never smoked. She has never used smokeless tobacco. She reports current alcohol use. She reports that she does not use drugs.    Family History:  Family History   Problem Relation Age of Onset     Melanoma No family hx of      Skin Cancer No family hx of        Medications:  Current Outpatient Medications   Medication Sig Dispense Refill     acetaminophen (ACETAMIN) 500 MG tablet Take 1-2 tablets by mouth every 6 hours as needed.       albuterol 90 MCG/ACT inhaler Inhale 2 puffs into the lungs every 6 hours as needed.       baclofen (LIORESAL) 10 MG tablet Take 2-3 tablets (20-30 mg) by mouth 3 times daily 810 tablet 3     beclomethasone (QVAR) 40 MCG/ACT inhaler Inhale 2 puffs into the lungs 2 times daily as needed.       BUPROPION HCL PO Take 150 mg by mouth daily       Calcium Carbonate-Vit D-Min (CALTRATE 600+D PLUS PO) Take 2 tablets by mouth daily.       captopril (CAPOTEN) 50 MG tablet Take  by mouth. 2 tablets in am and 1 tablet in pm       fluticasone (FLOVENT DISKUS) 100 MCG/BLIST AEPB Inhale 2 puffs into the lungs every 12 hours       gabapentin 8 % GEL Apply 2-4 g topically 2 times daily as needed To bilateral feet 60 g 11     HYDROcodone-acetaminophen 5-325 MG per tablet Take 1 tablet by mouth every 4 hours as needed. 30 tablet 0     Multiple Vitamins-Minerals (CENTRUM SILVER) per tablet Take 1 tablet by mouth daily       omeprazole (PRILOSEC) 20 MG capsule Take  20 mg by mouth 2 times daily.       sertraline (ZOLOFT) 100 MG tablet Take 100 mg by mouth daily.       triamterene-hydrochlorothiazide (MAXZIDE-25) 37.5-25 MG per tablet Take 1 tablet by mouth daily.          Allergies   Allergen Reactions     Pollen Extract      Sulfa Drugs        Review of Systems:  - Skin/Heme New Pt: The patient denies frequent sun exposure. The patient denies excessive scarring or problems healing except as per HPI. The patient denies excessive bleeding.  - Constitutional: Otherwise feeling well today, in usual state of health.  - Skin: As above in HPI. No additional skin concerns.    Physical exam:  Vitals: There were no vitals taken for this visit.  GEN: This is a well developed, well-nourished female in no acute distress, in a pleasant mood.    SKIN: Focused examination of the legs was performed.  - multiple violaceous/erythematous papules and plaques affecting the medial aspect of the lower extremities. Jakob with pressure  - No other lesions of concern on areas examined.                     In office labs or procedures performed today:   None    Impression/Plan:  1. Violaceous papules and plaques  - Based off H&P, DDx for condition is wide and includes: arthropod bite vs eczema vs early hypertrophic LP vs vascular proliferation (?[place]) vs other. Will do trial of clobetasol ointment for empiric therapy with gentle skin care and close follow up. If persistent, can consider 3mm punch biopsy at follow up but some concern about known peripheral vascular disease and poor wound healing from procedure.  - Prescribed clobetasol ointment BID to plaques   - Discussed that if ineffective, would consider biopsy of one of the lesions at follow up  - Provided information on gentle skin care  - Photodocumentation obtained in clinic    CC Makenzie Gaspar MD  [address] [place] [address] on close of this encounter.    Follow-up in 4 weeks, earlier for new or changing lesions.     Staff  Involved:  Patient was seen and staffed with attending physician Dr. Spencer Cooley MD  Med/Derm Resident PGY-3  P:[phone]    Staff Physician Comments:   I saw and evaluated the patient with the resident and I agree with the assessment and plan.  I was present for the examination.    Darian Palmer MD  Pronouns: he/him/his    Department of Dermatology  [facility]: Phone: [phone], Fax:[phone]  [facility]: Phone: [phone] Fax: [phone]

## 2021-01-13 ENCOUNTER — TRANSFERRED RECORDS (OUTPATIENT)
Dept: HEALTH INFORMATION MANAGEMENT | Facility: CLINIC | Age: 76
End: 2021-01-13

## 2021-04-16 ENCOUNTER — OFFICE VISIT (OUTPATIENT)
Dept: DERMATOLOGY | Facility: CLINIC | Age: 76
End: 2021-04-16
Payer: COMMERCIAL

## 2021-04-16 DIAGNOSIS — R23.8 PAPULES: ICD-10-CM

## 2021-04-16 DIAGNOSIS — G80.0 SPASTIC QUADRIPLEGIC CEREBRAL PALSY (H): ICD-10-CM

## 2021-04-16 DIAGNOSIS — R23.8 SKIN IRRITATION: Primary | ICD-10-CM

## 2021-04-16 PROCEDURE — 11104 PUNCH BX SKIN SINGLE LESION: CPT | Performed by: PHYSICIAN ASSISTANT

## 2021-04-16 PROCEDURE — 88312 SPECIAL STAINS GROUP 1: CPT | Performed by: PATHOLOGY

## 2021-04-16 PROCEDURE — 88305 TISSUE EXAM BY PATHOLOGIST: CPT | Performed by: PATHOLOGY

## 2021-04-16 PROCEDURE — 99213 OFFICE O/P EST LOW 20 MIN: CPT | Mod: 25 | Performed by: PHYSICIAN ASSISTANT

## 2021-04-16 RX ORDER — ZINC OXIDE
OINTMENT (GRAM) TOPICAL PRN
Qty: 113 G | Refills: 1 | Status: SHIPPED | OUTPATIENT
Start: 2021-04-16

## 2021-04-16 RX ORDER — LIDOCAINE HYDROCHLORIDE AND EPINEPHRINE 10; 10 MG/ML; UG/ML
3 INJECTION, SOLUTION INFILTRATION; PERINEURAL ONCE
Status: ACTIVE | OUTPATIENT
Start: 2021-04-16

## 2021-04-16 ASSESSMENT — PAIN SCALES - GENERAL: PAINLEVEL: NO PAIN (0)

## 2021-04-16 NOTE — PATIENT INSTRUCTIONS

## 2021-04-16 NOTE — LETTER
4/16/2021       RE: Benji Shankar  [address]  [place] [place] [address]     Dear Colleague,    Thank you for referring your patient, Benji Shankar, to the [facility] [place] at [facility]. Please see a copy of my visit note below.    [facility] Dermatology Note  Encounter Date: Apr 16, 2021  Office Visit      Dermatology Problem List:  1. Violaceous papules on lower extremities  - Ddx: arthropod bites vs eczema vs hypertrophic LP vs vascular proliferation vs other  - Failed empiric clobetasol BID x4wks  - biopsy 4/16/21  2. Peripheral vascular disease    Social: Sofiya ([phone]) is patient's PCA who has been with her for 15 years    PMHx: Spastic quadriplegic Cerebral Palsy  ____________________________________________    Assessment & Plan:  1. Violaceous papules and plaques- bilateral LEs  - Based off H&P, DDx for condition is wide and includes: arthropod bite vs eczema vs early hypertrophic LP vs vascular proliferation (?[place]) vs other. Failed trial of clobetasol ointment for empiric therapy with gentle skin care and close follow up.  -biopsy procedure notes - see below   -some concern about known peripheral vascular disease and poor wound healing from procedure.    2. Skin Irritation  -zinc oxide 40% cream as barrier PRN to bottom    Procedures Performed:   - Punch biopsy procedure note, location(s): R medial lower leg. After discussion of benefits and risks including but not limited to bleeding, infection, scar, incomplete removal, recurrence, and non-diagnostic biopsy, written consent and photographs were obtained. The area was cleaned with isopropyl alcohol. 0.5mL of 1% lidocaine with epinephrine was injected to obtain adequate anesthesia and a 3mm punch biopsy was performed at site(s). 4-0 Prolene sutures were utilized to approximate the epidermal edges. White petrolatum  ointment and a bandage was applied to the wound. Explicit verbal and written wound care instructions were provided. The patient left the dermatology clinic in good condition.      Follow-up: pending path results    Staff:     All risks, benefits and alternatives were discussed with patient.  Patient is in agreement and understands the assessment and plan.  All questions were answered.    Brenda Yoon PA-C, MPAS  [facility]: Phone: [phone], Fax: [phone]  [facility]: Phone: [phone],  Fax: [phone]  ____________________________________________    CC: RECHECK (pt states she is here for a follow up on the spots on both legs, pt states she was givein meds and she feels they are not working. )      HPI:  Ms. Benji Shankar is a 76 year old female who presents today as a return patient for rash on legs. Last seen 12/17/20 with Dr. Palmer who empirically started her on clobetasol ointment. She used this for without benefit. No associated sx. Has been present about 2 years per note from patient's PCA. Patient also requests a cream for a sore bottom. She notes she has been sitting more lately and has been using the clobetasol on her bottom without benefit. No other barrier creams are applied to her knowledge.     Patient is otherwise feeling well, without additional concerns.    Labs:  none    Physical Exam:  Vitals: There were no vitals taken for this visit.  SKIN: Focused examination of the bilateral LEs was performed.   -Bilateral LEs with scattered erythematous papules and plaques, some mild scale. Less erythema on today's exam that prior photos likely due to use of clobetasol, but otherwise relatively unchanged  - No other lesions of concern on areas examined.       Medications:  Current Outpatient Medications   Medication     acetaminophen (ACETAMIN) 500 MG tablet     albuterol 90 MCG/ACT inhaler      baclofen (LIORESAL) 10 MG tablet     beclomethasone (QVAR) 40 MCG/ACT inhaler     BUPROPION HCL PO     Calcium Carbonate-Vit D-Min (CALTRATE 600+D PLUS PO)     captopril (CAPOTEN) 50 MG tablet     clobetasol (TEMOVATE) 0.05 % external ointment     fluticasone (FLOVENT DISKUS) 100 MCG/BLIST AEPB     gabapentin 8 % GEL     HYDROcodone-acetaminophen 5-325 MG per tablet     Multiple Vitamins-Minerals (CENTRUM SILVER) per tablet     omeprazole (PRILOSEC) 20 MG capsule     sertraline (ZOLOFT) 100 MG tablet     triamterene-hydrochlorothiazide (MAXZIDE-25) 37.5-25 MG per tablet     No current facility-administered medications for this visit.       Past Medical/Surgical History:   Patient Active Problem List   Diagnosis     Urge incontinence     Urgency of urination     Post-operative state     Cerebral palsy (H)     Abdominal bloating     Asthma     Dehydration     Depression     Dysphagia, unspecified type     Essential hypertension     Gastroesophageal reflux disease, esophagitis presence not specified     Hypokalemia     Mild intermittent asthma without complication     Nausea     Osteoporosis     Cerebral palsy, unspecified type (H)     Major depressive disorder in remission, unspecified whether recurrent (H)     Past Medical History:   Diagnosis Date     Asthma     ALLERGIC RHINITIS     Cerebral palsy (H)      Cerebral palsy with spastic/ataxic diplegia      Chronic bronchitis (H)      Depression      DJD (degenerative joint disease)      Gastro-oesophageal reflux disease      Hypertension      Osteopenia        CC Dr. Fontenot on close of this encounter.

## 2021-04-16 NOTE — PROGRESS NOTES
[facility] Dermatology Note  Encounter Date: Apr 16, 2021  Office Visit      Dermatology Problem List:  1. Violaceous papules on lower extremities  - Ddx: arthropod bites vs eczema vs hypertrophic LP vs vascular proliferation vs other  - Failed empiric clobetasol BID x4wks  - biopsy 4/16/21  2. Peripheral vascular disease    Social: Sofiya ([phone]) is patient's PCA who has been with her for 15 years    PMHx: Spastic quadriplegic Cerebral Palsy  ____________________________________________    Assessment & Plan:  1. Violaceous papules and plaques- bilateral LEs  - Based off H&P, DDx for condition is wide and includes: arthropod bite vs eczema vs early hypertrophic LP vs vascular proliferation (?[place]) vs other. Failed trial of clobetasol ointment for empiric therapy with gentle skin care and close follow up.  -biopsy procedure notes - see below   -some concern about known peripheral vascular disease and poor wound healing from procedure.    2. Skin Irritation  -zinc oxide 40% cream as barrier PRN to bottom    Procedures Performed:   - Punch biopsy procedure note, location(s): R medial lower leg. After discussion of benefits and risks including but not limited to bleeding, infection, scar, incomplete removal, recurrence, and non-diagnostic biopsy, written consent and photographs were obtained. The area was cleaned with isopropyl alcohol. 0.5mL of 1% lidocaine with epinephrine was injected to obtain adequate anesthesia and a 3mm punch biopsy was performed at site(s). 4-0 Prolene sutures were utilized to approximate the epidermal edges. White petrolatum ointment and a bandage was applied to the wound. Explicit verbal and written wound care instructions were provided. The patient left the dermatology clinic in good condition.      Follow-up: pending path results    Staff:     All risks, benefits and alternatives were discussed with patient.  Patient is in agreement and understands the assessment  and plan.  All questions were answered.    Brenda Yoon PA-C, MPAS  [facility] [place]: Phone: [phone], Fax: [phone]  [facility]: Phone: [phone],  Fax: [phone]  ____________________________________________    CC: RECHECK (pt states she is here for a follow up on the spots on both legs, pt states she was givein meds and she feels they are not working. )      HPI:  Ms. Benji Shankar is a 76 year old female who presents today as a return patient for rash on legs. Last seen 12/17/20 with Dr. Palmer who empirically started her on clobetasol ointment. She used this for without benefit. No associated sx. Has been present about 2 years per note from patient's PCA. Patient also requests a cream for a sore bottom. She notes she has been sitting more lately and has been using the clobetasol on her bottom without benefit. No other barrier creams are applied to her knowledge.     Patient is otherwise feeling well, without additional concerns.    Labs:  none    Physical Exam:  Vitals: There were no vitals taken for this visit.  SKIN: Focused examination of the bilateral LEs was performed.   -Bilateral LEs with scattered erythematous papules and plaques, some mild scale. Less erythema on today's exam that prior photos likely due to use of clobetasol, but otherwise relatively unchanged  - No other lesions of concern on areas examined.       Medications:  Current Outpatient Medications   Medication     acetaminophen (ACETAMIN) 500 MG tablet     albuterol 90 MCG/ACT inhaler     baclofen (LIORESAL) 10 MG tablet     beclomethasone (QVAR) 40 MCG/ACT inhaler     BUPROPION HCL PO     Calcium Carbonate-Vit D-Min (CALTRATE 600+D PLUS PO)     captopril (CAPOTEN) 50 MG tablet     clobetasol (TEMOVATE) 0.05 % external ointment     fluticasone (FLOVENT DISKUS) 100 MCG/BLIST AEPB     gabapentin 8 % GEL     HYDROcodone-acetaminophen 5-325  MG per tablet     Multiple Vitamins-Minerals (CENTRUM SILVER) per tablet     omeprazole (PRILOSEC) 20 MG capsule     sertraline (ZOLOFT) 100 MG tablet     triamterene-hydrochlorothiazide (MAXZIDE-25) 37.5-25 MG per tablet     No current facility-administered medications for this visit.       Past Medical/Surgical History:   Patient Active Problem List   Diagnosis     Urge incontinence     Urgency of urination     Post-operative state     Cerebral palsy (H)     Abdominal bloating     Asthma     Dehydration     Depression     Dysphagia, unspecified type     Essential hypertension     Gastroesophageal reflux disease, esophagitis presence not specified     Hypokalemia     Mild intermittent asthma without complication     Nausea     Osteoporosis     Cerebral palsy, unspecified type (H)     Major depressive disorder in remission, unspecified whether recurrent (H)     Past Medical History:   Diagnosis Date     Asthma     ALLERGIC RHINITIS     Cerebral palsy (H)      Cerebral palsy with spastic/ataxic diplegia      Chronic bronchitis (H)      Depression      DJD (degenerative joint disease)      Gastro-oesophageal reflux disease      Hypertension      Osteopenia        CC Dr. Fontenot on close of this encounter.

## 2021-04-16 NOTE — NURSING NOTE
Chief Complaint   Patient presents with     RECHECK     pt states she is here for a follow up on the spots on both legs, pt states she was givein meds and she feels they are not working.      Chante Yates MA

## 2021-04-22 ENCOUNTER — TELEPHONE (OUTPATIENT)
Dept: DERMATOLOGY | Facility: CLINIC | Age: 76
End: 2021-04-22

## 2021-04-22 DIAGNOSIS — L57.0 AK (ACTINIC KERATOSIS): Primary | ICD-10-CM

## 2021-04-22 LAB — COPATH REPORT: NORMAL

## 2021-04-22 RX ORDER — FLUOROURACIL 50 MG/G
CREAM TOPICAL
Qty: 40 G | Refills: 0 | Status: SHIPPED | OUTPATIENT
Start: 2021-04-22

## 2021-04-22 NOTE — TELEPHONE ENCOUNTER
Brenda Yoon PA-C  [facility]             Can we please call Ahsan/Ahsan's caregiver (Sofiya) and give her the following info:     The bx on her leg was consistent with an AK, she has several of these lesions on her lower ankles. These are precancerous and can turn to skin cancer if left untreated. I would like to do a trial on Efudex cream to the lower legs/ankles. I will send an Rx. She needs to use it BID for 2 weeks. Apply a pea sized amount to the lower leg and rub in completley. Wash hands after application. She then needs to follow up in person 1mo after that-so about 6-8 weeks from now.     Please give her edu that the cream will make her skin irritated, inflamed, red, and possibly scaly. This is the desired reaction. Then, when she stops the cream the spots should heal up and go away. If any are left after we use this cream, we can evaluate that in person when she comes in for follow up.            LVM for patient to return call to clinic. Upon callback, plan is to review results and recommendations and stated above, as well as schedule follow up appointment with Brenda Yoon PA-C in 6-8 weeks.    Cristina Drummond RN

## 2021-04-23 NOTE — TELEPHONE ENCOUNTER
[facility] Call Center    Phone Message    May a detailed message be left on voicemail: yes     Reason for Call: Other: Sofiya called regarding below. She would like a call back to discuss. Thanks     Action Taken: Message routed to:  [facility]ERM    Travel Screening: Not Applicable

## 2021-06-15 ENCOUNTER — TELEPHONE (OUTPATIENT)
Dept: DERMATOLOGY | Facility: CLINIC | Age: 76
End: 2021-06-15

## 2021-06-15 NOTE — TELEPHONE ENCOUNTER
[facility] Call Center    Phone Message    May a detailed message be left on voicemail: yes     Reason for Call: Other: The pt was bumped from a June appt and the next avail with SUMMER Yoon is 11.2021. The pt's PCA is stating the pt cannot wait that long. The skin cancer on the pt's legs is eating holes in her legs. Please call the PCA to discuss the pt's health. Thanks.    Action Taken: Message routed to:  [facility]    Travel Screening: Not Applicable

## 2021-07-06 ENCOUNTER — TELEPHONE (OUTPATIENT)
Dept: DERMATOLOGY | Facility: CLINIC | Age: 76
End: 2021-07-06

## 2021-07-06 ENCOUNTER — OFFICE VISIT (OUTPATIENT)
Dept: DERMATOLOGY | Facility: CLINIC | Age: 76
End: 2021-07-06
Payer: COMMERCIAL

## 2021-07-06 DIAGNOSIS — D48.5 NEOPLASM OF UNCERTAIN BEHAVIOR OF SKIN: Primary | ICD-10-CM

## 2021-07-06 PROCEDURE — 88305 TISSUE EXAM BY PATHOLOGIST: CPT | Performed by: DERMATOLOGY

## 2021-07-06 PROCEDURE — 11102 TANGNTL BX SKIN SINGLE LES: CPT | Performed by: DERMATOLOGY

## 2021-07-06 PROCEDURE — 11103 TANGNTL BX SKIN EA SEP/ADDL: CPT | Performed by: DERMATOLOGY

## 2021-07-06 RX ORDER — OSTOMY SUPPLY 1"
1 EACH MISCELLANEOUS
Qty: 20 EACH | Refills: 1 | Status: SHIPPED | OUTPATIENT
Start: 2021-07-08

## 2021-07-06 ASSESSMENT — PAIN SCALES - GENERAL: PAINLEVEL: NO PAIN (0)

## 2021-07-06 NOTE — NURSING NOTE
Chief Complaint   Patient presents with     Derm Problem     pt states she is here for a lesion on right leg and pt also has spots on both her arm     Chante Yates MA

## 2021-07-06 NOTE — PATIENT INSTRUCTIONS
For the forearms:  - Try clobetasol 0.05% ointment twice daily for 2 weeks, then if not better, apply under Saran wrap occlusion (apply medication then wrap forearms in plastic wrap to keep medicine in place and make it more effective    For the biopsy sites:  - Duoderm dressing - apply every three days. The dressing is waterproof and will help keep the site clean in the shower. When removing the dressing, an odor is normal and does not indicate infection (spreading redness, pain, fever, chills, etc would be signs to know)      Wound Care After a Biopsy    What is a skin biopsy?  A skin biopsy allows the doctor to examine a very small piece of tissue under the microscope to determine the diagnosis and the best treatment for the skin condition. A local anesthetic (numbing medicine)  is injected with a very small needle into the skin area to be tested. A small piece of skin is taken from the area. Sometimes a suture (stitch) is used.     What are the risks of a skin biopsy?  I will experience scar, bleeding, swelling, pain, crusting and redness. I may experience incomplete removal or recurrence. Risks of this procedure are excessive bleeding, bruising, infection, nerve damage, numbness, thick (hypertrophic or keloidal) scar and non-diagnostic biopsy.    How should I care for my wound for the first 24 hours?    Keep the wound dry and covered for 24 hours    If it bleeds, hold direct pressure on the area for 15 minutes. If bleeding does not stop then go to the emergency room    Avoid strenuous exercise the first 1-2 days or as your doctor instructs you    How should I care for the wound after 24 hours?    After 24 hours, remove the bandage    You may bathe or shower as normal    If you had a scalp biopsy, you can shampoo as usual and can use shower water to clean the biopsy site daily    Clean the wound twice a day with gentle soap and water    Do not scrub, be gentle    Apply white petroleum/Vaseline after cleaning the  wound with a cotton swab or a clean finger, and keep the site covered with a Bandaid /bandage. Bandages are not necessary with a scalp biopsy    If you are unable to cover the site with a Bandaid /bandage, re-apply ointment 2-3 times a day to keep the site moist. Moisture will help with healing    Avoid strenuous activity for first 1-2 days    Avoid lakes, rivers, pools, and oceans until the stitches are removed or the site is healed    How do I clean my wound?    Wash hands thoroughly with soap or use hand  before all wound care    Clean the wound with gentle soap and water    Apply white petroleum/Vaseline  to wound after it is clean    Replace the Bandaid /bandage to keep the wound covered for the first few days or as instructed by your doctor    If you had a scalp biopsy, warm shower water to the area on a daily basis should suffice    What should I use to clean my wound?     Cotton-tipped applicators (Qtips )    White petroleum jelly (Vaseline ). Use a clean new container and use Q-tips to apply.    Bandaids   as needed    Gentle soap     How should I care for my wound long term?    Do not get your wound dirty    Keep up with wound care for one week or until the area is healed.    A small scab will form and fall off by itself when the area is completely healed. The area will be red and will become pink in color as it heals. Sun protection is very important for how your scar will turn out. Sunscreen with an SPF 30 or greater is recommended once the area is healed.    If you have stitches, stitches need to be removed in N/A days. You may return to our clinic for this or you may have it done locally at your doctor s office.    You should have some soreness but it should be mild and slowly go away over several days. Talk to your doctor about using tylenol for pain,    When should I call my doctor?  If you have increased:     Pain or swelling    Pus or drainage (clear or slightly yellow drainage is  ok)    Temperature over 100F    Spreading redness or warmth around wound    When will I hear about my results?  The biopsy results can take 2-3 weeks to come back. The clinic will call you with the results, send you a [employer]ide[employer] message, or have you schedule a follow-up clinic or phone time to discuss the results. Contact our clinics if you do not hear from us in 3 weeks.     Who should I call with questions?    [facility]: [phone]     [facility]: [phone]    For urgent needs outside of business hours call the [facility] at [phone] and ask for the dermatology resident on call

## 2021-07-06 NOTE — LETTER
7/6/2021       RE: Benji Shankar  [address]  [place] [address]     Dear Colleague,    Thank you for referring your patient, Benji Shankar, to the [facility] [place] at [facility]. Please see a copy of my visit note below.    [facility] Dermatology Note  Encounter Date: Jul 6, 2021  Office Visit     Dermatology Problem List:  #. Violaceous papules on lower extremities  #. Peripheral vascular disease  #. NUB R shin, L medial leg  ____________________________________________    Assessment & Plan:     #. NUB R shin, L medial leg  - Shave biopsy performed today (see procedure note(s) below).      Procedures Performed:   - Shave biopsy procedure note, location(s): R shin, L medial leg. After discussion of benefits and risks including but not limited to bleeding, infection, scar, incomplete removal, recurrence, and non-diagnostic biopsy, written consent and photographs were obtained. The area was cleaned with isopropyl alcohol. 0.5mL of 1% lidocaine with epinephrine was injected to obtain adequate anesthesia of lesion(s). Shave biopsy at site(s) performed. Hemostasis was achieved with aluminium chloride. Petrolatum ointment and a sterile dressing were applied. The patient tolerated the procedure and no complications were noted. The patient was provided with verbal and written post care instructions.     Follow-up: pending path results    Staff:     Dominic Triplett MD  Dermatology/Dermatopathology Staff Physician  , Department of Dermatology    ____________________________________________    CC: Derm Problem (pt states she is here for a lesion on right leg and pt also has spots on both her arm)    HPI:  Ms. Benji Shankar is a(n) 76 year old female who presents today as a return patient for lesions of concern on the R shin, L medial leg. Persistent and scaly  since last visit with Brenda Yoon PA-C. She requests biopsy for diagnostic clarification.    Patient is otherwise feeling well, without additional skin concerns.     Labs Reviewed:  N/A    Physical Exam:  Vitals: There were no vitals taken for this visit.  SKIN: Sun-exposed skin, which includes the head/face, neck, both arms, digits, and/or nails was examined.   - 1cm pink-white papule on the R shin with nonspecific dermoscopy and ?crystalline structures  - 8mm white-pink papule on the L medial leg with hyperkeratosis  - No other lesions of concern on areas examined.     Medications:  Current Outpatient Medications   Medication     acetaminophen (ACETAMIN) 500 MG tablet     albuterol 90 MCG/ACT inhaler     baclofen (LIORESAL) 10 MG tablet     beclomethasone (QVAR) 40 MCG/ACT inhaler     BUPROPION HCL PO     Calcium Carbonate-Vit D-Min (CALTRATE 600+D PLUS PO)     captopril (CAPOTEN) 50 MG tablet     clobetasol (TEMOVATE) 0.05 % external ointment     Control Gel Formula Dressing (DUODERM CGF DRESSING) MISC     fluorouracil (EFUDEX) 5 % external cream     fluticasone (FLOVENT DISKUS) 100 MCG/BLIST AEPB     gabapentin 8 % GEL     HYDROcodone-acetaminophen 5-325 MG per tablet     Multiple Vitamins-Minerals (CENTRUM SILVER) per tablet     omeprazole (PRILOSEC) 20 MG capsule     sertraline (ZOLOFT) 100 MG tablet     triamterene-hydrochlorothiazide (MAXZIDE-25) 37.5-25 MG per tablet     zinc oxide (DESITIN) 40 % external ointment     Current Facility-Administered Medications   Medication     lidocaine 1% with EPINEPHrine 1:100,000 injection 3 mL      Past Medical History:   Patient Active Problem List   Diagnosis     Urge incontinence     Urgency of urination     Post-operative state     Cerebral palsy (H)     Abdominal bloating     Asthma     Dehydration     Depression     Dysphagia, unspecified type     Essential hypertension     Gastroesophageal reflux disease, esophagitis presence not specified     Hypokalemia      Mild intermittent asthma without complication     Nausea     Osteoporosis     Cerebral palsy, unspecified type (H)     Major depressive disorder in remission, unspecified whether recurrent (H)     Past Medical History:   Diagnosis Date     Asthma     ALLERGIC RHINITIS     Cerebral palsy (H)      Cerebral palsy with spastic/ataxic diplegia      Chronic bronchitis (H)      Depression      DJD (degenerative joint disease)      Gastro-oesophageal reflux disease      Hypertension      Osteopenia        CC Referred Self, MD  No address on file on close of this encounter.

## 2021-07-08 ENCOUNTER — TELEPHONE (OUTPATIENT)
Dept: DERMATOLOGY | Facility: CLINIC | Age: 76
End: 2021-07-08

## 2021-07-08 LAB — COPATH REPORT: NORMAL

## 2021-07-08 NOTE — TELEPHONE ENCOUNTER
Prior Authorization Retail Medication Request    Medication/Dose: Control Gel Formula Dressing (DUODERM CGF DRESSING) MISC  ICD code (if different than what is on RX):  D48.5  Previously Tried and Failed:  See provider note  Rationale:  Externally apply 1 application topically twice a week    Insurance Name:  [employer]  Insurance ID:  [id number]

## 2021-07-08 NOTE — TELEPHONE ENCOUNTER
PRIOR AUTHORIZATION DENIED    Medication: Control Gel Formula Dressing (DUODERM CGF DRESSING) MISC--DENIED    Denial Date: 7/8/2021    Denial Rational: Per insurance rep product is excluded and no PA can be done for it.

## 2021-08-06 NOTE — PROGRESS NOTES
[facility] Dermatology Note  Encounter Date: Jul 6, 2021  Office Visit     Dermatology Problem List:  #. Violaceous papules on lower extremities  #. Peripheral vascular disease  #. NUB R shin, L medial leg  ____________________________________________    Assessment & Plan:     #. NUB R shin, L medial leg  - Shave biopsy performed today (see procedure note(s) below).      Procedures Performed:   - Shave biopsy procedure note, location(s): R shin, L medial leg. After discussion of benefits and risks including but not limited to bleeding, infection, scar, incomplete removal, recurrence, and non-diagnostic biopsy, written consent and photographs were obtained. The area was cleaned with isopropyl alcohol. 0.5mL of 1% lidocaine with epinephrine was injected to obtain adequate anesthesia of lesion(s). Shave biopsy at site(s) performed. Hemostasis was achieved with aluminium chloride. Petrolatum ointment and a sterile dressing were applied. The patient tolerated the procedure and no complications were noted. The patient was provided with verbal and written post care instructions.     Follow-up: pending path results    Staff:     Dominic Triplett MD  Dermatology/Dermatopathology Staff Physician  , Department of Dermatology    ____________________________________________    CC: Derm Problem (pt states she is here for a lesion on right leg and pt also has spots on both her arm)    HPI:  Ms. Benji Shankar is a(n) 76 year old female who presents today as a return patient for lesions of concern on the R shin, L medial leg. Persistent and scaly since last visit with Brenda Yoon PA-C. She requests biopsy for diagnostic clarification.    Patient is otherwise feeling well, without additional skin concerns.     Labs Reviewed:  N/A    Physical Exam:  Vitals: There were no vitals taken for this visit.  SKIN: Sun-exposed skin, which includes the head/face, neck, both arms, digits, and/or  nails was examined.   - 1cm pink-white papule on the R shin with nonspecific dermoscopy and ?crystalline structures  - 8mm white-pink papule on the L medial leg with hyperkeratosis  - No other lesions of concern on areas examined.     Medications:  Current Outpatient Medications   Medication     acetaminophen (ACETAMIN) 500 MG tablet     albuterol 90 MCG/ACT inhaler     baclofen (LIORESAL) 10 MG tablet     beclomethasone (QVAR) 40 MCG/ACT inhaler     BUPROPION HCL PO     Calcium Carbonate-Vit D-Min (CALTRATE 600+D PLUS PO)     captopril (CAPOTEN) 50 MG tablet     clobetasol (TEMOVATE) 0.05 % external ointment     Control Gel Formula Dressing (DUODERM CGF DRESSING) MISC     fluorouracil (EFUDEX) 5 % external cream     fluticasone (FLOVENT DISKUS) 100 MCG/BLIST AEPB     gabapentin 8 % GEL     HYDROcodone-acetaminophen 5-325 MG per tablet     Multiple Vitamins-Minerals (CENTRUM SILVER) per tablet     omeprazole (PRILOSEC) 20 MG capsule     sertraline (ZOLOFT) 100 MG tablet     triamterene-hydrochlorothiazide (MAXZIDE-25) 37.5-25 MG per tablet     zinc oxide (DESITIN) 40 % external ointment     Current Facility-Administered Medications   Medication     lidocaine 1% with EPINEPHrine 1:100,000 injection 3 mL      Past Medical History:   Patient Active Problem List   Diagnosis     Urge incontinence     Urgency of urination     Post-operative state     Cerebral palsy (H)     Abdominal bloating     Asthma     Dehydration     Depression     Dysphagia, unspecified type     Essential hypertension     Gastroesophageal reflux disease, esophagitis presence not specified     Hypokalemia     Mild intermittent asthma without complication     Nausea     Osteoporosis     Cerebral palsy, unspecified type (H)     Major depressive disorder in remission, unspecified whether recurrent (H)     Past Medical History:   Diagnosis Date     Asthma     ALLERGIC RHINITIS     Cerebral palsy (H)      Cerebral palsy with spastic/ataxic diplegia       Chronic bronchitis (H)      Depression      DJD (degenerative joint disease)      Gastro-oesophageal reflux disease      Hypertension      Osteopenia        CC Referred Self, MD  No address on file on close of this encounter.

## 2021-10-04 ENCOUNTER — OFFICE VISIT (OUTPATIENT)
Dept: PHYSICAL MEDICINE AND REHAB | Facility: CLINIC | Age: 76
End: 2021-10-04
Payer: COMMERCIAL

## 2021-10-04 VITALS
HEART RATE: 73 BPM | OXYGEN SATURATION: 96 % | SYSTOLIC BLOOD PRESSURE: 150 MMHG | RESPIRATION RATE: 16 BRPM | DIASTOLIC BLOOD PRESSURE: 87 MMHG

## 2021-10-04 DIAGNOSIS — M79.2 NEUROPATHIC PAIN: ICD-10-CM

## 2021-10-04 DIAGNOSIS — G80.0 SPASTIC QUADRIPLEGIC CEREBRAL PALSY (H): Primary | ICD-10-CM

## 2021-10-04 PROCEDURE — 99215 OFFICE O/P EST HI 40 MIN: CPT | Performed by: PHYSICAL MEDICINE & REHABILITATION

## 2021-10-04 RX ORDER — GABAPENTIN 100 MG/1
100 CAPSULE ORAL 3 TIMES DAILY
Qty: 270 CAPSULE | Refills: 4 | Status: SHIPPED | OUTPATIENT
Start: 2021-10-04 | End: 2022-12-28

## 2021-10-04 ASSESSMENT — PAIN SCALES - GENERAL: PAINLEVEL: EXTREME PAIN (8)

## 2021-10-04 NOTE — PROGRESS NOTES
[facility]   PM&R clinic note        Interval history:     Benji Shankar presents to clinic today for follow up reg her rehab needs. She has h/o spastic diplegic CP and was followed by Dr. Posey. I saw her last 11/2/2020.      Medical issues since last visit,   No ED visit or hospitalization.   She was seen and evaluated by dermatology team. Should f/u in November regarding pathology results.       Symptoms,   Spasticity in arms and legs are stable on current baclofen dose. She has dull pain 'all over her body', sometimes sharp in nature. Pain typically occured in her hands but it has been in her feet, ankles, and bilateral lower extremities lately. She had swelling in her legs which has actually improved over the past year. Her PCA has been very helpful with applying compression socks for her every day and they have not had any issues finding shoes as they did before. She has intact sensation in her legs.     Sofiya, her PCA, was worried about some skin discoloration / redness at her sacral area at last visit. No open wounds but definitely a pressure injury. She tried 7-8 different types of cushion but no change in her skin color/redness. Ahsan can't change position in bed or in her chair when she is alone at home. She has a sleep number bed/mattress and can move the head of the bed. I sent a new referral to seating clinic at [place] where her chair is issued. Per chart review eval was done in Jan 2021.     Sofiya has noted some worsening in Ahsan's memory recently.     Therapies/HEP,   No active therapies. She does some stretching exercises on a regular basis.   I sent a home SLP referral but it didn't happen.     Equipment,   She got her power [employer] through [facility] which is fitting well, other than her issue with pressure sacral wound and cushion. Ahsan noted that she received a new cushion but it was difficult to use. She was higher in her chair and didn't feel  safe in the chair so stopped using it.     She uses a Cesilia Steady to transfer from bed to chair but she has been requiring more assistance from Sofiya lately. She has b/l AFOs last used 2-3 years ago, not used now since she does not ambulate.      Functionally, she requires max assistance of caregiver for ADLs. Ahsan requires assistance with all transfers, showers and dressing. Sofiya her PCA works in her home for 8 hours per day (two 4-hour blocks, mornings and evenings) 7 days per week. Ahsan stays in her power chair while Sofiya is away. Regarding fine motor tasks, her left hand functions better than right hand. She is independent with food preparation and feeding herself. Per pt report, she has been non-ambulatory for about 10 years.   Sofiya also helps with medication management.     Social history is unchanged. During the day, she spends time with her service dog, goes on shopping, outings, and cooks dinner in the evening.       Medications:  Current Outpatient Medications   Medication Sig Dispense Refill     gabapentin (NEURONTIN) 100 MG capsule Take 1 capsule (100 mg) by mouth 3 times daily 270 capsule 4     acetaminophen (ACETAMIN) 500 MG tablet Take 1-2 tablets by mouth every 6 hours as needed.       albuterol 90 MCG/ACT inhaler Inhale 2 puffs into the lungs every 6 hours as needed.       baclofen (LIORESAL) 10 MG tablet Take 2-3 tablets (20-30 mg) by mouth 3 times daily 810 tablet 3     beclomethasone (QVAR) 40 MCG/ACT inhaler Inhale 2 puffs into the lungs 2 times daily as needed.       BUPROPION HCL PO Take 150 mg by mouth daily       Calcium Carbonate-Vit D-Min (CALTRATE 600+D PLUS PO) Take 2 tablets by mouth daily.       captopril (CAPOTEN) 50 MG tablet Take  by mouth. 2 tablets in am and 1 tablet in pm       clobetasol (TEMOVATE) 0.05 % external ointment Apply topically 2 times daily The purple spots on lower legs 60 g 1     Control Gel Formula Dressing (DUODERM CGF DRESSING) MISC Externally apply 1  Application topically twice a week 20 each 1     fluorouracil (EFUDEX) 5 % external cream Apply topically to the lower legs/ankles BID x 14 days, or until the onset of irritation. Wash hands after application. 40 g 0     fluticasone (FLOVENT DISKUS) 100 MCG/BLIST AEPB Inhale 2 puffs into the lungs every 12 hours       gabapentin 8 % GEL Apply 2-4 g topically 2 times daily as needed To bilateral feet 60 g 11     Multiple Vitamins-Minerals (CENTRUM SILVER) per tablet Take 1 tablet by mouth daily       omeprazole (PRILOSEC) 20 MG capsule Take 20 mg by mouth 2 times daily.       sertraline (ZOLOFT) 100 MG tablet Take 100 mg by mouth daily.       triamterene-hydrochlorothiazide (MAXZIDE-25) 37.5-25 MG per tablet Take 1 tablet by mouth daily.       zinc oxide (DESITIN) 40 % external ointment Apply topically as needed for dry skin or irritation 113 g 1              Physical Exam:   BP (!) 150/87   Pulse 73   Resp 16   SpO2 96%     Gen: NAD, pleasant and cooperative, sitting comfortably in power WC  HEENT: b/l hearing aids in place.  Cardio: regular pulse  Pulm: non-labored breathing in room air  Abd: benign  Ext: WWP, trace edema in BLE, no tenderness in calves  Neuro/MSK: forward head posture and upper thoracic kyphosis. She was able to extend her neck and rest her head on the head rest.   Right hand rests in clenched fist position. Full active ROM hands and fingers. Slightly limited b/l shoulder active ROM. Sensation intact to light touch b/l UEs and LEs. Strength 4/5 at bilateral shoulder abd and 4+/5 distally at bilateral upper extremities. HF 1-2/5 b/l, KF/KE 3/5 within limited range and no volitional DF/EHL. Increased tone and known contracture at bilateral knees (-20 from neutral position). Speech fluent and appropriate fund of knowledge. Full-range affect.       Labs/Imaging:  Lab Results   Component Value Date    WBC 7.2 01/24/2018    HGB 14.8 01/24/2018    HCT 44.0 01/24/2018    MCV 90 01/24/2018      01/24/2018     Lab Results   Component Value Date     01/24/2018    POTASSIUM 3.2 (L) 01/24/2018    CHLORIDE 104 01/24/2018    CO2 27 01/24/2018    GLC 85 01/24/2018     Lab Results   Component Value Date    GFRESTIMATED 86 01/24/2018    GFRESTBLACK >90 01/24/2018     Lab Results   Component Value Date    AST 14 01/24/2018    ALT 22 01/24/2018    ALKPHOS 73 01/24/2018    BILITOTAL 0.3 01/24/2018     No results found for: INR  Lab Results   Component Value Date    BUN 26 01/24/2018    CR 0.67 01/24/2018              Assessment/Plan       Spastic diplegic cerebral palsy     Sacral pressure injury     Chronic 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  My exam shows worsening weakness in her bilateral lower extremity which is likely due to deconditioning and aging with CP. No clinical s/s of lumbar radiculopathy / myelopathy or any central lesion at this point. I didn't measure her contracture at knees at last visit but that seemed worse to me as well. I completely agree that she needs a placido lift at this point for safety and to prevent injuries. Will send a [facility] to [facility].     She is on baclofen for spasticity; no spasms. Contractures are chronic but seem a little worse at knees. Increasing baclofen dose will not help with that and I don't think botulinum toxin injections are appropriate given her age, and current function (she uses some tone to transfer). So will continue the same dose for baclofen but will ask Sofiya to possibly help with more stretching exercises. Will also order home PT as maintenance therapy.     Pain is likely multifactorial due to spasticity, OA, and neuropathic. Started low dose gabapentin and will follow up in a month.     Her pressure injury at sacral area seems to be stable. She has to tilt her WC more often  during the day to  redistribute the pressure in order to reduce the risk of pressure wounds.    Sent a referral for home SLP for more evaluation of her cognitive deficits at last visit which didn't happen. Will send another referral.     She will benefit from evaluation of her bone health; will send a referral to Dr. Castanon.      Except for gabapentin prescription, I will wait to talk to Sofiya in a month (asked her to be present during our follow up video visit) and then will send the above referrals.      1. Work-up: none    2. Therapy/equipment/braces: home SLP for more evaluation of her cognitive deficits. Home PT to work on conditioning and stretching. Needs a nancy lift as above.    3. Medications: continue current baclofen dose, may take 1-2 additional doses as needed for worsening spasms    4. Interventions: none    5. Referral / follow up with other providers: see above.    6. Follow up: in one month.       Makenzie Gaspar MD  Physical Medicine & Rehabilitation

## 2021-10-04 NOTE — PATIENT INSTRUCTIONS
Next visit please have with Sofiya (PCA) at the meeting also.  Booked as telephone visit, you will get a call about 20- minutes before the the appointment to check in and set up.  If you can do the appointment on a commuter or smart phone, that would add visual content.    Appt. Is Thursday Nov 4 @ 11:00    We will discuss equipment needs, and start of new medicine, gabapentin, did it help? Any side effects?    To start: 100 mg capsules, take one capsule three times a day to help decrease nerve pain.  After 7 days, increase to 300 mg (3 capsules) three times a day.    If dose is tolerated, next refill will give 90 day supply and write Rx for one year.    Any questions before then, please call Kasia LEE @ [phone]

## 2021-10-04 NOTE — NURSING NOTE
Chief Complaint   Patient presents with     RECHECK     [facility] Return - One year f/u     Kwan Gracia

## 2021-10-04 NOTE — LETTER
10/4/2021       RE: Benji Shankar  [address]  [place] [address]     Dear Colleague,    Thank you for referring your patient, Benji Shankar, to the [facility] [place] [facility]. Please see a copy of my visit note below.  [facility]   PM&R clinic note      Interval history:     Benji Shankar presents to clinic today for follow up reg her rehab needs. She has h/o spastic diplegic CP and was followed by Dr. Posey. I saw her last 11/2/2020.      Medical issues since last visit,   No ED visit or hospitalization.   She was seen and evaluated by dermatology team. Should f/u in November regarding pathology results.       Symptoms,   Spasticity in arms and legs are stable on current baclofen dose. She has dull pain 'all over her body', sometimes sharp in nature. Pain typically occured in her hands but it has been in her feet, ankles, and bilateral lower extremities lately. She had swelling in her legs which has actually improved over the past year. Her PCA has been very helpful with applying compression socks for her every day and they have not had any issues finding shoes as they did before. She has intact sensation in her legs.     Sofiya, her PCA, was worried about some skin discoloration / redness at her sacral area at last visit. No open wounds but definitely a pressure injury. She tried 7-8 different types of cushion but no change in her skin color/redness. Ahsan can't change position in bed or in her chair when she is alone at home. She has a sleep number bed/mattress and can move the head of the bed. I sent a new referral to seating clinic at [place] where her chair is issued. Per chart review eval was done in Jan 2021.     Sofiya has noted some worsening in Ahsan's memory recently.     Therapies/HEP,   No active therapies.  She does some stretching exercises on a regular basis.   I sent a home SLP referral but it didn't happen.     Equipment,   She got her power WC through [place] which is fitting well, other than her issue with pressure sacral wound and cushion. Ahsan noted that she received a new cushion but it was difficult to use. She was higher in her chair and didn't feel safe in the chair so stopped using it.     She uses a Cesilia Steady to transfer from bed to chair but she has been requiring more assistance from Sofiya lately. She has b/l AFOs last used 2-3 years ago, not used now since she does not ambulate.      Functionally, she requires max assistance of caregiver for ADLs. Ahsan requires assistance with all transfers, showers and dressing. Sofiya her PCA works in her home for 8 hours per day (two 4-hour blocks, mornings and evenings) 7 days per week. Ahsan stays in her power chair while Sofiya is away. Regarding fine motor tasks, her left hand functions better than right hand. She is independent with food preparation and feeding herself. Per pt report, she has been non-ambulatory for about 10 years.   Sofiya also helps with medication management.     Social history is unchanged. During the day, she spends time with her service dog, goes on shopping, outings, and cooks dinner in the evening.       Medications:  Current Outpatient Medications   Medication Sig Dispense Refill     gabapentin (NEURONTIN) 100 MG capsule Take 1 capsule (100 mg) by mouth 3 times daily 270 capsule 4     acetaminophen (ACETAMIN) 500 MG tablet Take 1-2 tablets by mouth every 6 hours as needed.       albuterol 90 MCG/ACT inhaler Inhale 2 puffs into the lungs every 6 hours as needed.       baclofen (LIORESAL) 10 MG tablet Take 2-3 tablets (20-30 mg) by mouth 3 times daily 810 tablet 3     beclomethasone (QVAR) 40 MCG/ACT inhaler Inhale 2 puffs into the lungs 2 times daily as needed.       BUPROPION HCL PO Take 150 mg by mouth daily       Calcium  Carbonate-Vit D-Min (CALTRATE 600+D PLUS PO) Take 2 tablets by mouth daily.       captopril (CAPOTEN) 50 MG tablet Take  by mouth. 2 tablets in am and 1 tablet in pm       clobetasol (TEMOVATE) 0.05 % external ointment Apply topically 2 times daily The purple spots on lower legs 60 g 1     Control Gel Formula Dressing (DUODERM CGF DRESSING) MISC Externally apply 1 Application topically twice a week 20 each 1     fluorouracil (EFUDEX) 5 % external cream Apply topically to the lower legs/ankles BID x 14 days, or until the onset of irritation. Wash hands after application. 40 g 0     fluticasone (FLOVENT DISKUS) 100 MCG/BLIST AEPB Inhale 2 puffs into the lungs every 12 hours       gabapentin 8 % GEL Apply 2-4 g topically 2 times daily as needed To bilateral feet 60 g 11     Multiple Vitamins-Minerals (CENTRUM SILVER) per tablet Take 1 tablet by mouth daily       omeprazole (PRILOSEC) 20 MG capsule Take 20 mg by mouth 2 times daily.       sertraline (ZOLOFT) 100 MG tablet Take 100 mg by mouth daily.       triamterene-hydrochlorothiazide (MAXZIDE-25) 37.5-25 MG per tablet Take 1 tablet by mouth daily.       zinc oxide (DESITIN) 40 % external ointment Apply topically as needed for dry skin or irritation 113 g 1              Physical Exam:   BP (!) 150/87   Pulse 73   Resp 16   SpO2 96%     Gen: NAD, pleasant and cooperative, sitting comfortably in power WC  HEENT: b/l hearing aids in place.  Cardio: regular pulse  Pulm: non-labored breathing in room air  Abd: benign  Ext: WWP, trace edema in BLE, no tenderness in calves  Neuro/MSK: forward head posture and upper thoracic kyphosis. She was able to extend her neck and rest her head on the head rest.   Right hand rests in clenched fist position. Full active ROM hands and fingers. Slightly limited b/l shoulder active ROM. Sensation intact to light touch b/l UEs and LEs. Strength 4/5 at bilateral shoulder abd and 4+/5 distally at bilateral upper extremities. HF 1-2/5 b/l,  KF/KE 3/5 within limited range and no volitional DF/EHL. Increased tone and known contracture at bilateral knees (-20 from neutral position). Speech fluent and appropriate fund of knowledge. Full-range affect.       Labs/Imaging:  Lab Results   Component Value Date    WBC 7.2 01/24/2018    HGB 14.8 01/24/2018    HCT 44.0 01/24/2018    MCV 90 01/24/2018     01/24/2018     Lab Results   Component Value Date     01/24/2018    POTASSIUM 3.2 (L) 01/24/2018    CHLORIDE 104 01/24/2018    CO2 27 01/24/2018    GLC 85 01/24/2018     Lab Results   Component Value Date    GFRESTIMATED 86 01/24/2018    GFRESTBLACK >90 01/24/2018     Lab Results   Component Value Date    AST 14 01/24/2018    ALT 22 01/24/2018    ALKPHOS 73 01/24/2018    BILITOTAL 0.3 01/24/2018     No results found for: INR  Lab Results   Component Value Date    BUN 26 01/24/2018    CR 0.67 01/24/2018              Assessment/Plan       Spastic diplegic cerebral palsy     Sacral pressure injury     Chronic 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  My exam shows worsening weakness in her bilateral lower extremity which is likely due to deconditioning and aging with CP. No clinical s/s of lumbar radiculopathy / myelopathy or any central lesion at this point. I didn't measure her contracture at knees at last visit but that seemed worse to me as well. I completely agree that she needs a placido lift at this point for safety and to prevent injuries. Will send a prescription to [facility].     She is on baclofen for spasticity; no spasms. Contractures are chronic but seem a little worse at knees. Increasing baclofen dose will not help with that and I don't think botulinum toxin injections are appropriate given her age, and current function (she uses some tone to transfer). So will continue the  same dose for baclofen but will ask Sofiya to possibly help with more stretching exercises. Will also order home PT as maintenance therapy.     Pain is likely multifactorial due to spasticity, OA, and neuropathic. Started low dose gabapentin and will follow up in a month.     Her pressure injury at sacral area seems to be stable. She has to tilt her WC more often during the day to  redistribute the pressure in order to reduce the risk of pressure wounds.    Sent a referral for home SLP for more evaluation of her cognitive deficits at last visit which didn't happen. Will send another referral.     She will benefit from evaluation of her bone health; will send a referral to Dr. Castanon.      Except for gabapentin prescription, I will wait to talk to Sofiya in a month (asked her to be present during our follow up video visit) and then will send the above referrals.      1. Work-up: none    2. Therapy/equipment/braces: home SLP for more evaluation of her cognitive deficits. Home PT to work on conditioning and stretching. Needs a nancy lift as above.    3. Medications: continue current baclofen dose, may take 1-2 additional doses as needed for worsening spasms    4. Interventions: none    5. Referral / follow up with other providers: see above.    6. Follow up: in one month.       Makenzie Gaspar MD  Physical Medicine & Rehabilitation       Again, thank you for allowing me to participate in the care of your patient.      Sincerely,    Makenzie Gaspar MD

## 2021-11-02 ENCOUNTER — OFFICE VISIT (OUTPATIENT)
Dept: DERMATOLOGY | Facility: CLINIC | Age: 76
End: 2021-11-02
Payer: COMMERCIAL

## 2021-11-02 DIAGNOSIS — L57.0 AK (ACTINIC KERATOSIS): Primary | ICD-10-CM

## 2021-11-02 PROCEDURE — 17000 DESTRUCT PREMALG LESION: CPT | Mod: GC | Performed by: DERMATOLOGY

## 2021-11-02 PROCEDURE — 17003 DESTRUCT PREMALG LES 2-14: CPT | Mod: GC | Performed by: DERMATOLOGY

## 2021-11-02 ASSESSMENT — PAIN SCALES - GENERAL: PAINLEVEL: NO PAIN (0)

## 2021-11-02 NOTE — PROGRESS NOTES
[facility] Dermatology Note  Encounter Date: Nov 2, 2021  Office Visit     Dermatology Problem List:  # Violaceous papules on lower extremities  # Peripheral vascular disease  # Ulcer, scar, and mildly atypical epithelium with focal parakeratosis, R shin, L medial leg    ____________________________________________    Assessment & Plan:    # Actinic keratosis. Nose, arms, legs  - Biopsy of lower legs suggestive of hypertrophic actinic keratoses. Exam with overlying crust on leg lesions but not consistent with any malignant process, likely normal healing. Overall, biopsy sites healing well with no indications for LN therapy. Does have scaly, erythematous papules on nose and b/l arms c/w AKs so treated with LN today.  - Cryotherapy performed today (see procedure note(s) below).  - Sun protection: Counseled SPF30+ sunscreen  - For lesion right lower leg, apply vaseline then hydrocolloid dressing every 2-3 days until lesion heals over  - Will have return in 6 months for skin check     Procedures Performed:   - Cryotherapy procedure note, location(s): nose, b/l forearms. After verbal consent and discussion of risks and benefits including, but not limited to, dyspigmentation/scar, blister, and pain, 10 lesion(s) was(were) treated with 1-2 mm freeze border for 1-2 cycles with liquid nitrogen. Post cryotherapy instructions were provided.    Follow-up: 6 month(s) in-person, or earlier for new or changing lesions    Staff and Resident:    IAlec MD, discussed and evaluated the patient with Dr. Triplett.    Scribe Disclosure:  Dylan TRIPATHI, am serving as a scribe to document services personally performed by Dominic Triplett MD based on data collection and the provider's statements to me.     Staff Physician Comments:   I saw and evaluated the patient with the resident and I agree with the assessment and plan.  I was present for the entire minor procedure and examination. I have made edits  if needed.    Dominic Triplett MD  Staff Dermatologist and Dermatopathologist  , Department of Dermatology    ____________________________________________    CC: RECHECK (pt states she is here for a lesion on both legs, sx are the same. pt also has lesion on both arms )    HPI:  Ms. Benji Shankar is a(n) 76 year old female who presents today as a return patient for actinic keratoses. Last seen by Dr. Triplett on 7/6/2021, at which time patient underwent shave biopsy for treatment of NUBs on the right shin which returned as ulcer, scar, and mildly atypical epithelium with focal parakeratosis and left medial leg which returned as ulcer, scar, and mildly atypical epithelium with focal parakeratosis..    Today, states biopsy sites healed up well. She now has questions about scaly spots on arms. Are occurring more. Are not tender, itchy or bleeding. Endorses significant sun exposure with burns in her youth.    Patient is otherwise feeling well, without additional skin concerns.    Labs Reviewed:  Dermatopathology 7/2021    FINAL DIAGNOSIS:   A. Skin, right shin, shave:   - Ulcer, scar, and mildly atypical epithelium with focal parakeratosis -   (see comment and description)     B. Skin, left medial leg, shave:   - Ulcer, scar, and mildly atypical epithelium with focal parakeratosis -   (see comment and description)     COMMENT:   A-B. The findings present in these specimens are favored to represent   traumatized hypertrophic actinic   keratosis, though the epithelial atypia may be related entirely to   adjacency to the ulcer and prior trauma.   There is no evidence of carcinoma in the planes examined. Ongoing clinical    monitoring is recommended.     Physical Exam:  Vitals: There were no vitals taken for this visit.  SKIN: Focused examination of face, arms, lower legs was performed.  - Nose and b/l forearms there are erythematous, gritty pink papules  - On the right lower extremity there is  overlying crust of previous biopsy site that is smaller when compared to previous photos  - No lesion on lower left lef  - No other lesions of concern on areas examined.     Medications:  Current Outpatient Medications   Medication     acetaminophen (ACETAMIN) 500 MG tablet     albuterol 90 MCG/ACT inhaler     baclofen (LIORESAL) 10 MG tablet     beclomethasone (QVAR) 40 MCG/ACT inhaler     BUPROPION HCL PO     Calcium Carbonate-Vit D-Min (CALTRATE 600+D PLUS PO)     captopril (CAPOTEN) 50 MG tablet     clobetasol (TEMOVATE) 0.05 % external ointment     Control Gel Formula Dressing (DUODERM CGF DRESSING) MISC     fluorouracil (EFUDEX) 5 % external cream     fluticasone (FLOVENT DISKUS) 100 MCG/BLIST AEPB     gabapentin (NEURONTIN) 100 MG capsule     gabapentin 8 % GEL     Multiple Vitamins-Minerals (CENTRUM SILVER) per tablet     omeprazole (PRILOSEC) 20 MG capsule     sertraline (ZOLOFT) 100 MG tablet     triamterene-hydrochlorothiazide (MAXZIDE-25) 37.5-25 MG per tablet     zinc oxide (DESITIN) 40 % external ointment     Current Facility-Administered Medications   Medication     lidocaine 1% with EPINEPHrine 1:100,000 injection 3 mL      Past Medical History:   Patient Active Problem List   Diagnosis     Urge incontinence     Urgency of urination     Post-operative state     Cerebral palsy (H)     Abdominal bloating     Asthma     Dehydration     Depression     Dysphagia, unspecified type     Essential hypertension     Gastroesophageal reflux disease, esophagitis presence not specified     Hypokalemia     Mild intermittent asthma without complication     Nausea     Osteoporosis     Cerebral palsy, unspecified type (H)     Major depressive disorder in remission, unspecified whether recurrent (H)     Past Medical History:   Diagnosis Date     Asthma     ALLERGIC RHINITIS     Cerebral palsy (H)      Cerebral palsy with spastic/ataxic diplegia      Chronic bronchitis (H)      Depression      DJD (degenerative joint  disease)      Gastro-oesophageal reflux disease      Hypertension      Osteopenia         CC Marcus Santana MD  [facility]  [address]  [place] [address] on close of this encounter.

## 2021-11-02 NOTE — LETTER
11/2/2021       RE: Benji Shankar  [address]  [place] [id number]     Dear Colleague,    Thank you for referring your patient, Benji Shankar, to the [facility] [place] at [facility]. Please see a copy of my visit note below.    [facility] Dermatology Note  Encounter Date: Nov 2, 2021  Office Visit     Dermatology Problem List:  # Violaceous papules on lower extremities  # Peripheral vascular disease  # Ulcer, scar, and mildly atypical epithelium with focal parakeratosis, R shin, L medial leg    ____________________________________________    Assessment & Plan:    # Actinic keratosis. Nose, arms, legs  - Biopsy of lower legs suggestive of hypertrophic actinic keratoses. Exam with overlying crust on leg lesions but not consistent with any malignant process, likely normal healing. Overall, biopsy sites healing well with no indications for LN therapy. Does have scaly, erythematous papules on nose and b/l arms c/w AKs so treated with LN today.  - Cryotherapy performed today (see procedure note(s) below).  - Sun protection: Counseled SPF30+ sunscreen  - For lesion right lower leg, apply vaseline then hydrocolloid dressing every 2-3 days until lesion heals over  - Will have return in 6 months for skin check     Procedures Performed:   - Cryotherapy procedure note, location(s): nose, b/l forearms. After verbal consent and discussion of risks and benefits including, but not limited to, dyspigmentation/scar, blister, and pain, 10 lesion(s) was(were) treated with 1-2 mm freeze border for 1-2 cycles with liquid nitrogen. Post cryotherapy instructions were provided.    Follow-up: 6 month(s) in-person, or earlier for new or changing lesions    Staff and Resident:    Alec TRIPATHI MD, discussed and evaluated the patient with Dr. Triplett.    Scribe Disclosure:  Dylan TRIPATHI am  serving as a scribe to document services personally performed by Dominic Triplett MD based on data collection and the provider's statements to me.     Staff Physician Comments:   I saw and evaluated the patient with the resident and I agree with the assessment and plan.  I was present for the entire minor procedure and examination. I have made edits if needed.    Dominic Triplett MD  Staff Dermatologist and Dermatopathologist  , Department of Dermatology    ____________________________________________    CC: RECHECK (pt states she is here for a lesion on both legs, sx are the same. pt also has lesion on both arms )    HPI:  Ms. Benji Shankar is a(n) 76 year old female who presents today as a return patient for actinic keratoses. Last seen by Dr. Triplett on 7/6/2021, at which time patient underwent shave biopsy for treatment of NUBs on the right shin which returned as ulcer, scar, and mildly atypical epithelium with focal parakeratosis and left medial leg which returned as ulcer, scar, and mildly atypical epithelium with focal parakeratosis..    Today, states biopsy sites healed up well. She now has questions about scaly spots on arms. Are occurring more. Are not tender, itchy or bleeding. Endorses significant sun exposure with burns in her youth.    Patient is otherwise feeling well, without additional skin concerns.    Labs Reviewed:  Dermatopathology 7/2021    FINAL DIAGNOSIS:   A. Skin, right shin, shave:   - Ulcer, scar, and mildly atypical epithelium with focal parakeratosis -   (see comment and description)     B. Skin, left medial leg, shave:   - Ulcer, scar, and mildly atypical epithelium with focal parakeratosis -   (see comment and description)     COMMENT:   A-B. The findings present in these specimens are favored to represent   traumatized hypertrophic actinic   keratosis, though the epithelial atypia may be related entirely to   adjacency to the ulcer and prior trauma.   There  is no evidence of carcinoma in the planes examined. Ongoing clinical    monitoring is recommended.     Physical Exam:  Vitals: There were no vitals taken for this visit.  SKIN: Focused examination of face, arms, lower legs was performed.  - Nose and b/l forearms there are erythematous, gritty pink papules  - On the right lower extremity there is overlying crust of previous biopsy site that is smaller when compared to previous photos  - No lesion on lower left lef  - No other lesions of concern on areas examined.     Medications:  Current Outpatient Medications   Medication     acetaminophen (ACETAMIN) 500 MG tablet     albuterol 90 MCG/ACT inhaler     baclofen (LIORESAL) 10 MG tablet     beclomethasone (QVAR) 40 MCG/ACT inhaler     BUPROPION HCL PO     Calcium Carbonate-Vit D-Min (CALTRATE 600+D PLUS PO)     captopril (CAPOTEN) 50 MG tablet     clobetasol (TEMOVATE) 0.05 % external ointment     Control Gel Formula Dressing (DUODERM CGF DRESSING) MISC     fluorouracil (EFUDEX) 5 % external cream     fluticasone (FLOVENT DISKUS) 100 MCG/BLIST AEPB     gabapentin (NEURONTIN) 100 MG capsule     gabapentin 8 % GEL     Multiple Vitamins-Minerals (CENTRUM SILVER) per tablet     omeprazole (PRILOSEC) 20 MG capsule     sertraline (ZOLOFT) 100 MG tablet     triamterene-hydrochlorothiazide (MAXZIDE-25) 37.5-25 MG per tablet     zinc oxide (DESITIN) 40 % external ointment     Current Facility-Administered Medications   Medication     lidocaine 1% with EPINEPHrine 1:100,000 injection 3 mL      Past Medical History:   Patient Active Problem List   Diagnosis     Urge incontinence     Urgency of urination     Post-operative state     Cerebral palsy (H)     Abdominal bloating     Asthma     Dehydration     Depression     Dysphagia, unspecified type     Essential hypertension     Gastroesophageal reflux disease, esophagitis presence not specified     Hypokalemia     Mild intermittent asthma without complication     Nausea      Osteoporosis     Cerebral palsy, unspecified type (H)     Major depressive disorder in remission, unspecified whether recurrent (H)     Past Medical History:   Diagnosis Date     Asthma     ALLERGIC RHINITIS     Cerebral palsy (H)      Cerebral palsy with spastic/ataxic diplegia      Chronic bronchitis (H)      Depression      DJD (degenerative joint disease)      Gastro-oesophageal reflux disease      Hypertension      Osteopenia         CC Marcus Santana MD  [facility]  [address]  [place] [address] on close of this encounter.

## 2021-11-02 NOTE — PATIENT INSTRUCTIONS
Cryotherapy Instructions    For the areas treated with liquid nitrogen (cryotherapy or freezing) today, they are expected to get pink, puffy, and perhaps even blister. The area should then crust up and fall off and the goal is to have nice new skin underneath. There is nothing special that you need to do for these areas. You can wash them as you do normal skin.     Sometimes a blister develops; if a blister does develop do NOT pop it. However, if it breaks open on its own, be sure to wash it with soap and water daily and put plain vasaline or petroleum jelly and a bandaid on it until the skin is healed.     Please call the clinic if you have any questions or concerns.       The spots on arms and legs are called actinic keratoses. These just come from sun damage over time. We froze them in the office today to prevent them from turning into any type of skin cancer. If lesions do blister and the skin peels off, please apply vaseline to the skin and no need to cover up. We will see Ahsan back in 6 months for a skin check.    For the wound on the right leg, cut out a small area of the hydrocolloid dressing every 2-3 days and apply over wound with vaseline. Can stop once run out of bandage or lesion is healed. It is overall healing well.

## 2021-11-04 ENCOUNTER — VIRTUAL VISIT (OUTPATIENT)
Dept: PHYSICAL MEDICINE AND REHAB | Facility: CLINIC | Age: 76
End: 2021-11-04
Payer: COMMERCIAL

## 2021-11-04 DIAGNOSIS — M79.2 NEUROPATHIC PAIN: ICD-10-CM

## 2021-11-04 DIAGNOSIS — R41.89 COGNITIVE DEFICITS: ICD-10-CM

## 2021-11-04 DIAGNOSIS — G80.0 SPASTIC QUADRIPLEGIC CEREBRAL PALSY (H): Primary | ICD-10-CM

## 2021-11-04 PROCEDURE — 99214 OFFICE O/P EST MOD 30 MIN: CPT | Mod: 24 | Performed by: PHYSICAL MEDICINE & REHABILITATION

## 2021-11-04 RX ORDER — BECLOMETHASONE DIPROPIONATE HFA 40 UG/1
1 AEROSOL, METERED RESPIRATORY (INHALATION) DAILY
COMMUNITY

## 2021-11-04 RX ORDER — ALBUTEROL SULFATE 90 UG/1
2 AEROSOL, METERED RESPIRATORY (INHALATION) EVERY 6 HOURS PRN
COMMUNITY

## 2021-11-04 NOTE — PROGRESS NOTES
Ahsan is a 76 year old who is being evaluated via a billable telephone visit.      What phone number would you like to be contacted at? [phone] (pca Sofiya)   How would you like to obtain your AVS? Mail  Phone call duration: 25 minutes    ---------------------    [facility]   PM&R clinic note        Interval history:     Benji Shankar has been followed in our clinic regarding her rehab needs. She has h/o spastic diplegic CP and was followed by Dr. Posey. I saw her last 10/4/2021. We asked Sofiya her PCA to be on the phone to assist with care coordination and her care.      Medical issues since last visit,   No ED visit or hospitalization.   She was seen and evaluated by dermatology team. Should f/u in November regarding pathology results.       Symptoms,   Spasticity in arms and legs are stable on current baclofen dose. She has dull pain 'all over her body', sometimes sharp in nature. Pain typically occured in her hands but it has been in her feet, ankles, and bilateral lower extremities lately. She had swelling in her legs which has actually improved over the past year. Her PCA has been very helpful with applying compression socks for her every day and they have not had any issues finding shoes as they did before. She has intact sensation in her legs.     Sofiya, her PCA, was worried about some skin discoloration / redness at her sacral area at last visit. No open wounds but definitely a pressure injury. She tried 7-8 different types of cushion but no change in her skin color/redness. Ahsan can't change position in bed or in her chair when she is alone at home. She has a sleep number bed/mattress and can move the head of the bed. I sent a new referral to seating clinic at [facility] where her chair is issued. Per chart review eval was done in Jan 2021.     Sofiya has noted some worsening in Ahsan's memory recently.     Therapies/HEP,   No active therapies. She does some  stretching exercises on a regular basis.   I sent a home SLP referral but it didn't happen.     Equipment,   She got her power WC through [facility] which is fitting well, other than her issue with pressure sacral wound and cushion. Ahsan noted that she received a new cushion but it was difficult to use. She was higher in her chair and didn't feel safe in the chair so stopped using it.     She uses a Cesilia Steady to transfer from bed to chair but she has been requiring more assistance from Sofiya lately. She has b/l AFOs last used 2-3 years ago, not used now since she does not ambulate.      Functionally, she requires max assistance of caregiver for ADLs. Ahsan requires assistance with all transfers, showers and dressing. Sofiya her PCA works in her home for 6 hours per day during the week and 5 hours over the weekend. Ahsan stays in her power chair while Sofiya is away. Regarding fine motor tasks, her left hand functions better than right hand. She is independent with food preparation and feeding herself. Per pt report, she has been non-ambulatory for about 10 years.   Sofiya also helps with medication management.     Social history is unchanged. During the day, she spends time with her service dog, goes on shopping, outings, and cooks dinner in the evening.       Medications:  Current Outpatient Medications   Medication Sig Dispense Refill     acetaminophen (ACETAMIN) 500 MG tablet Take 1-2 tablets by mouth every 6 hours as needed.       albuterol (VENTOLIN HFA) 108 (90 Base) MCG/ACT inhaler Inhale 2 puffs into the lungs every 6 hours as needed       albuterol 90 MCG/ACT inhaler Inhale 2 puffs into the lungs every 6 hours as needed.       baclofen (LIORESAL) 10 MG tablet Take 2-3 tablets (20-30 mg) by mouth 3 times daily 810 tablet 3     beclomethasone (QVAR) 40 MCG/ACT inhaler Inhale 2 puffs into the lungs 2 times daily as needed.       beclomethasone HFA (QVAR REDIHALER) 40 MCG/ACT inhaler Inhale 1 puff into the  lungs daily       BUPROPION HCL PO Take 150 mg by mouth daily       Calcium Carbonate-Vit D-Min (CALTRATE 600+D PLUS PO) Take 2 tablets by mouth daily.       captopril (CAPOTEN) 50 MG tablet Take  by mouth. 2 tablets in am and 1 tablet in pm       clobetasol (TEMOVATE) 0.05 % external ointment Apply topically 2 times daily The purple spots on lower legs 60 g 1     Control Gel Formula Dressing (DUODERM CGF DRESSING) MISC Externally apply 1 Application topically twice a week 20 each 1     fluorouracil (EFUDEX) 5 % external cream Apply topically to the lower legs/ankles BID x 14 days, or until the onset of irritation. Wash hands after application. 40 g 0     fluticasone (FLOVENT DISKUS) 100 MCG/BLIST AEPB Inhale 2 puffs into the lungs every 12 hours       gabapentin (NEURONTIN) 100 MG capsule Take 1 capsule (100 mg) by mouth 3 times daily 270 capsule 4     gabapentin 8 % GEL Apply 2-4 g topically 2 times daily as needed To bilateral feet 60 g 11     Multiple Vitamins-Minerals (CENTRUM SILVER) per tablet Take 1 tablet by mouth daily       omeprazole (PRILOSEC) 20 MG capsule Take 20 mg by mouth 2 times daily.       sertraline (ZOLOFT) 100 MG tablet Take 100 mg by mouth daily.       triamterene-hydrochlorothiazide (MAXZIDE-25) 37.5-25 MG per tablet Take 1 tablet by mouth daily.       zinc oxide (DESITIN) 40 % external ointment Apply topically as needed for dry skin or irritation 113 g 1              Physical Exam:   There were no vitals taken for this visit.    Phone visit     Labs/Imaging:  Lab Results   Component Value Date    WBC 7.2 01/24/2018    HGB 14.8 01/24/2018    HCT 44.0 01/24/2018    MCV 90 01/24/2018     01/24/2018     Lab Results   Component Value Date     01/24/2018    POTASSIUM 3.2 (L) 01/24/2018    CHLORIDE 104 01/24/2018    CO2 27 01/24/2018    GLC 85 01/24/2018     Lab Results   Component Value Date    GFRESTIMATED 86 01/24/2018    GFRESTBLACK >90 01/24/2018     Lab Results   Component  Value Date    AST 14 01/24/2018    ALT 22 01/24/2018    ALKPHOS 73 01/24/2018    BILITOTAL 0.3 01/24/2018     No results found for: INR  Lab Results   Component Value Date    BUN 26 01/24/2018    CR 0.67 01/24/2018              Assessment/Plan       Spastic diplegic cerebral palsy     Sacral pressure injury     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 No clinical s/s of lumbar radiculopathy / myelopathy or any central lesion at this point. I didn't measure her contracture at knees at last visit but that seemed worse to me as well. I completely agree that she needs a nancy lift at this point for safety and to prevent injuries. Sent a prescription to [facility].     She is on baclofen for spasticity; no spasms. Contractures are chronic but seem a little worse at knees. Increasing baclofen dose will not help with that and I don't think botulinum toxin injections are appropriate given her age, and current function (she uses some tone to transfer). So will continue the same dose for baclofen. Sent home PT to work on ROM and to establish a HEP.     Pain is likely multifactorial due to spasticity, OA, and neuropathic. Started low dose gabapentin with the plan to titrate slowly; she has not noticed any improvement or benefit yet. She is currently taking 200-100-200. Recommended to continue to increase the dose to 300 tid and call with any questions.    Her pressure injury at sacral area seems to be stable. She has to tilt her WC more often during the day to  redistribute the pressure in order to reduce the risk of pressure wounds. This was discussed again today and Lenka will follow.    Sent a referral for home SLP for more evaluation of her cognitive deficits at last visit which didn't happen. Sent another referral today.     She will benefit from evaluation  of her bone health but appointments are sometime overwhelming. Will consider referral to Dr. Castanon at follow up.      Except for gabapentin prescription, I will wait to talk to Sofiya in a month (asked her to be present during our follow up video visit) and then will send the above referrals.      1. Work-up: none    2. Therapy/equipment/braces: home SLP for more evaluation of her cognitive deficits. Home PT and OT to work on conditioning and stretching. Needs a nancy lift as above.    3. Medications: continue current baclofen dose, may take 1-2 additional doses as needed for worsening spasms    4. Interventions: none    5. Referral / follow up with other providers: see above.    6. Follow up: in one year.       Makenzie Gaspar MD  Physical Medicine & Rehabilitation

## 2021-11-04 NOTE — LETTER
11/4/2021       RE: Benji Shankar  [address]  [facility] [place] [address]     Dear Colleague,    Thank you for referring your patient, Benji Shankar, to the [facility] PHYSICAL MEDICINE AND REHABILITATION CLINIC [place] at [facility]. Please see a copy of my visit note below.    [facility]   PM&R clinic note      Interval history:     Benji Shankar has been followed in our clinic regarding her rehab needs. She has h/o spastic diplegic CP and was followed by Dr. Posey. I saw her last 10/4/2021. We asked Sofiya her PCA to be on the phone to assist with care coordination and her care.      Medical issues since last visit,   No ED visit or hospitalization.   She was seen and evaluated by dermatology team. Should f/u in November regarding pathology results.       Symptoms,   Spasticity in arms and legs are stable on current baclofen dose. She has dull pain 'all over her body', sometimes sharp in nature. Pain typically occured in her hands but it has been in her feet, ankles, and bilateral lower extremities lately. She had swelling in her legs which has actually improved over the past year. Her PCA has been very helpful with applying compression socks for her every day and they have not had any issues finding shoes as they did before. She has intact sensation in her legs.     Sofiya, her PCA, was worried about some skin discoloration / redness at her sacral area at last visit. No open wounds but definitely a pressure injury. She tried 7-8 different types of cushion but no change in her skin color/redness. Ahsan can't change position in bed or in her chair when she is alone at home. She has a sleep number bed/mattress and can move the head of the bed. I sent a new referral to seating clinic at [place] where her chair is issued. Per chart review eval was done in Jan 2021.     Sofiya has  noted some worsening in Ahsan's memory recently.     Therapies/HEP,   No active therapies. She does some stretching exercises on a regular basis.   I sent a home SLP referral but it didn't happen.     Equipment,   She got her power WC through [employer] which is fitting well, other than her issue with pressure sacral wound and cushion. Ahsan noted that she received a new cushion but it was difficult to use. She was higher in her chair and didn't feel safe in the chair so stopped using it.     She uses a Cesilia Steady to transfer from bed to chair but she has been requiring more assistance from Sofiya lately. She has b/l AFOs last used 2-3 years ago, not used now since she does not ambulate.      Functionally, she requires max assistance of caregiver for ADLs. Ahsan requires assistance with all transfers, showers and dressing. Sofiya her PCA works in her home for 6 hours per day during the week and 5 hours over the weekend. Ahsan stays in her power chair while Sofiya is away. Regarding fine motor tasks, her left hand functions better than right hand. She is independent with food preparation and feeding herself. Per pt report, she has been non-ambulatory for about 10 years.   Sofiya also helps with medication management.     Social history is unchanged. During the day, she spends time with her service dog, goes on shopping, outings, and cooks dinner in the evening.       Medications:  Current Outpatient Medications   Medication Sig Dispense Refill     acetaminophen (ACETAMIN) 500 MG tablet Take 1-2 tablets by mouth every 6 hours as needed.       albuterol (VENTOLIN HFA) 108 (90 Base) MCG/ACT inhaler Inhale 2 puffs into the lungs every 6 hours as needed       albuterol 90 MCG/ACT inhaler Inhale 2 puffs into the lungs every 6 hours as needed.       baclofen (LIORESAL) 10 MG tablet Take 2-3 tablets (20-30 mg) by mouth 3 times daily 810 tablet 3     beclomethasone (QVAR) 40 MCG/ACT inhaler Inhale 2 puffs into the lungs 2  times daily as needed.       beclomethasone HFA (QVAR REDIHALER) 40 MCG/ACT inhaler Inhale 1 puff into the lungs daily       BUPROPION HCL PO Take 150 mg by mouth daily       Calcium Carbonate-Vit D-Min (CALTRATE 600+D PLUS PO) Take 2 tablets by mouth daily.       captopril (CAPOTEN) 50 MG tablet Take  by mouth. 2 tablets in am and 1 tablet in pm       clobetasol (TEMOVATE) 0.05 % external ointment Apply topically 2 times daily The purple spots on lower legs 60 g 1     Control Gel Formula Dressing (DUODERM CGF DRESSING) MISC Externally apply 1 Application topically twice a week 20 each 1     fluorouracil (EFUDEX) 5 % external cream Apply topically to the lower legs/ankles BID x 14 days, or until the onset of irritation. Wash hands after application. 40 g 0     fluticasone (FLOVENT DISKUS) 100 MCG/BLIST AEPB Inhale 2 puffs into the lungs every 12 hours       gabapentin (NEURONTIN) 100 MG capsule Take 1 capsule (100 mg) by mouth 3 times daily 270 capsule 4     gabapentin 8 % GEL Apply 2-4 g topically 2 times daily as needed To bilateral feet 60 g 11     Multiple Vitamins-Minerals (CENTRUM SILVER) per tablet Take 1 tablet by mouth daily       omeprazole (PRILOSEC) 20 MG capsule Take 20 mg by mouth 2 times daily.       sertraline (ZOLOFT) 100 MG tablet Take 100 mg by mouth daily.       triamterene-hydrochlorothiazide (MAXZIDE-25) 37.5-25 MG per tablet Take 1 tablet by mouth daily.       zinc oxide (DESITIN) 40 % external ointment Apply topically as needed for dry skin or irritation 113 g 1              Physical Exam:   There were no vitals taken for this visit.    Phone visit     Labs/Imaging:  Lab Results   Component Value Date    WBC 7.2 01/24/2018    HGB 14.8 01/24/2018    HCT 44.0 01/24/2018    MCV 90 01/24/2018     01/24/2018     Lab Results   Component Value Date     01/24/2018    POTASSIUM 3.2 (L) 01/24/2018    CHLORIDE 104 01/24/2018    CO2 27 01/24/2018    GLC 85 01/24/2018     Lab Results    Component Value Date    GFRESTIMATED 86 01/24/2018    GFRESTBLACK >90 01/24/2018     Lab Results   Component Value Date    AST 14 01/24/2018    ALT 22 01/24/2018    ALKPHOS 73 01/24/2018    BILITOTAL 0.3 01/24/2018     No results found for: INR  Lab Results   Component Value Date    BUN 26 01/24/2018    CR 0.67 01/24/2018              Assessment/Plan       Spastic diplegic cerebral palsy     Sacral pressure injury     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 No clinical s/s of lumbar radiculopathy / myelopathy or any central lesion at this point. I didn't measure her contracture at knees at last visit but that seemed worse to me as well. I completely agree that she needs a nancy lift at this point for safety and to prevent injuries. Sent a prescription to [facility].     She is on baclofen for spasticity; no spasms. Contractures are chronic but seem a little worse at knees. Increasing baclofen dose will not help with that and I don't think botulinum toxin injections are appropriate given her age, and current function (she uses some tone to transfer). So will continue the same dose for baclofen. Sent home PT to work on ROM and to establish a HEP.     Pain is likely multifactorial due to spasticity, OA, and neuropathic. Started low dose gabapentin with the plan to titrate slowly; she has not noticed any improvement or benefit yet. She is currently taking 200-100-200. Recommended to continue to increase the dose to 300 tid and call with any questions.    Her pressure injury at sacral area seems to be stable. She has to tilt her WC more often during the day to  redistribute the pressure in order to reduce the risk of pressure wounds. This was discussed again today and Sofiya and Ashan will follow.    Sent a referral for home SLP for more evaluation of her cognitive  deficits at last visit which didn't happen. Sent another referral today.     She will benefit from evaluation of her bone health but appointments are sometime overwhelming. Will consider referral to Dr. Castanon at follow up.      Except for gabapentin prescription, I will wait to talk to Sofiya in a month (asked her to be present during our follow up video visit) and then will send the above referrals.      1. Work-up: none    2. Therapy/equipment/braces: home SLP for more evaluation of her cognitive deficits. Home PT and OT to work on conditioning and stretching. Needs a nancy lift as above.    3. Medications: continue current baclofen dose, may take 1-2 additional doses as needed for worsening spasms    4. Interventions: none    5. Referral / follow up with other providers: see above.    6. Follow up: in one year.       Makenzie Gaspar MD  Physical Medicine & Rehabilitation           Again, thank you for allowing me to participate in the care of your patient.      Sincerely,    Makenzie Gaspar MD

## 2021-11-16 ENCOUNTER — TELEPHONE (OUTPATIENT)
Dept: PHYSICAL MEDICINE AND REHAB | Facility: CLINIC | Age: 76
End: 2021-11-16
Payer: COMMERCIAL

## 2021-11-16 NOTE — TELEPHONE ENCOUNTER
"Video visit 11/4 Thursday - increased gabapentin from 100 / 100 / 100 to 200 / 100/ 200    A few days later, Ahsan had an abrupt change in mentation, not remembering things, not eating as usual and getting her meds mixed up.    Ira completely stopped the gabapentin for Ahsan last week, can't be sure if it was mon, tues or wed, (Nov 8, 9, or 10)    Nov 10th Ahsan continued to be confused, had not had a bowel movement in several days, did not feel right.  Nov 10 (Wed) Ira sent Ahsan to [facility] to be seen in ED  Ira got a call from a [facility] clinic, where she was brought incorrectly, then told to go to ED  Another different clinic called Ira 30 minutes later when she got there, and again redirected to go to ED.    Ahsan called Yadi at 4:30 on Wed 11/10 while she was at the ED, she had wrist bands on, but had not bee seen.      1. Abruptly stopped gabapentin - total of 500 mg/ day for 4 or 5 days (had been taking 300 mg/ day for one month from 10/4 to 11/4 without SE but without benefit)    2. Ira went to see Ahsan in the evening Wed 11/10 to find her sitting in the lobby of the apartment building, stated she didn't have a sloan.  Ira estimates Ahsan was sitting there for 4 or 5 hours.  Found her keys in her bag, noticed that she had already taken the full day's pills that were also in the bag.    3. Ahsan has \"rock hard\"abdomen and has not responded to trials to help move bowels - oatmeal, prune juice, etc,   Ira feels that it has been 10 days since a known bowel movement    4. Ahsan is making urine, though has more episodes of incontinence since she is too weak to use the toilet.    5. Ahsan is weaker, cannot transfer or use her Cesilia Stedy to rise from sitting, due to new arm weakness.    6. No record of being seen at all at [facility] / per Ira, PCP does not \"treat\" Ahsan because the staff cannot get her out of her chair when she has gone there.    Will review with DR" Ira Gaspar wants to know if she should send Ahsan somewhere else?    Kasia Abarca RN on 11/16/2021 at 2:56 PM

## 2021-11-16 NOTE — TELEPHONE ENCOUNTER
Discussed in person with Dr Gaspar, Ahsan needs to be evaluated in ED, [facility] is closest ED, and Ira should call 911 for the transport.  Attempted to reach Ira, left a voice mail of this message.    Kasia Abarca RN on 11/16/2021 at 3:23 PM

## 2021-11-16 NOTE — TELEPHONE ENCOUNTER
Health Call Center    Phone Message    May a detailed message be left on voicemail: yes     Reason for Call: Symptoms or Concerns     If patient has red-flag symptoms, warm transfer to triage line    Current symptom or concern: confusion, forgetfulness,     Symptoms have been present for:  12 day(s)    Has patient previously been seen for this? Yes    By : Dr. Gaspar    Date: 11/4/2021    Are there any new or worsening symptoms? Yes: Patients PCA Cele calling stating she is concerned regarding patients status. Cele states patient has not had a bowel movement in 10 days, has been forgetful as to not eating, taking her medication over and over again, sitting for hours in the lobby stating she doesn't have her keys when she does. Cele states it has started to increase with medication Gabapentin and would like a return call to discuss as she states she recently took patient off of the medication. Thank you.       Action Taken: Message routed to:  [facility] neurology    Travel Screening: Not Applicable

## 2021-11-16 NOTE — TELEPHONE ENCOUNTER
"Called to Ahsan, she said \"Ira will be here tonight\"  And to call to talk with Ira-  Writer explained that I had talked with Ira as well as Dr Gaspar, who advised that she go to the ED to be evaluated.  Ahsan said she is not opposed to that, she doesn't feel she is well for several days.    Writer stayed on the line with her and called 911, we were transferred to the [facility] non urgent transportation line and the paramedics arrived at 3:58 pm to take her to [facility].    Called to Ira again and updated that Ahsan was taken by ambulance to [facility], she will check in there to see what is needed.    Kasia Abarca RN on 11/16/2021 at 4:06 PM        "

## 2022-02-17 PROBLEM — K21.9 GASTROESOPHAGEAL REFLUX DISEASE: Status: ACTIVE | Noted: 2017-07-19

## 2022-06-13 NOTE — PROGRESS NOTES
[facility] Dermatology Note  Encounter Date: Jun 14, 2022  Office Visit     Dermatology Problem List:  # Violaceous papules on lower extremities, bx consistent with hypertrophic AK  #Disseminated Superficial Actinic Porokeratosis- efudex ointment started this visit.  # Sacral pressure ulcer--referral to wound care  # Peripheral vascular disease  # Ulcer, scar, and mildly atypical epithelium with focal parakeratosis, R shin, L medial leg  ____________________________________________    Assessment & Plan:    # Actinic keratosis. b/l lower legs  # Disseminated Superficial Actinic Porokeratosis  - Biopsy of lower legs suggestive of hypertrophic actinic keratoses.  - Previously treated with cryotherapy, none today.  - Plan to start efudex ointment daily for 30 days, follow up in 3-4 months  - Sun protection: Counseled SPF30+ sunscreen     # Sacral pressure ulcer, stage 2   - Referred to wound care clinic    Procedures Performed:   None.    Follow-up: 3 month(s) in-person, or earlier for new or changing lesions    Staff, Resident, and Scribe:     Scribe Disclosure:  I, Vicenta Valle, am serving as a scribe to document services personally performed by Dominic Triplett MD based on data collection and the provider's statements to me.     Precepted with Dr. Triplett.    Indiana Cheng MD  Pager # [phone]  [facility]    Staff Physician Comments:   I saw and evaluated the patient with the resident and I agree with the assessment and plan. I have made edits if needed.    Dominic Triplett MD  Staff Dermatologist and Dermatopathologist  , Department of Dermatology     ____________________________________________    CC: Derm Problem (Pt is here for a 6m follow up lesion on legs, states that they come and go and are about the same. )      HPI:  Ms. Benji Shankar is a(n) 77 year old female who presents today as a return patient for follow up on some lesions on  her legs, accompanied by her PCA. Last seen by myself on 11/2/21, at which time the patient was treated with cryo for 10 AKs (nose, arms).      DSAP  Painful when the skin breaks open; sore with palpation. Skin is very fragile over b/l legs  R leg is a little worse than L. Stays that way for several months.  No pruritis.  Has not been doing too much Vaseline or wraps; has been using shea butter on the legs to keep them hydrated.  Small areas of scaly rough lesions as well.    Sacral ulcer  Also noting some sores on her buttock with significant skin break down, sacral pressure wound.  Has been using shea butter, helping some.  Has never followed with wound care  At times frustrated with cares  Is wheelchair bound, sitting most of the day  Will require Placido lift to stand    Patient is otherwise feeling well, without additional skin concerns.    Labs Reviewed:  Dermatopathology 7/2021     FINAL DIAGNOSIS:   A. Skin, right shin, shave:   - Ulcer, scar, and mildly atypical epithelium with focal parakeratosis -   (see comment and description)     B. Skin, left medial leg, shave:   - Ulcer, scar, and mildly atypical epithelium with focal parakeratosis -   (see comment and description)     COMMENT:   A-B. The findings present in these specimens are favored to represent   traumatized hypertrophic actinic   keratosis, though the epithelial atypia may be related entirely to   adjacency to the ulcer and prior trauma.   There is no evidence of carcinoma in the planes examined. Ongoing clinical    monitoring is recommended.    Physical Exam:  Vitals: There were no vitals taken for this visit.  SKIN: Focused examination of b/l legs was performed. PCA brought images of sacral wound.  - Significant skin tearing with superficial ulceration overlying sacrum in photos brought by PCA, appears to be stage 2. PCA endorses wound is slightly improved from previous images which were taken last week.   - Several small erythematous scaling  lesions approximately 5mm-1 cm in diameter on b/l shins.   -Several scattered ecchymosis with some skin breakdown on b/l shins.  - No other lesions of concern on areas examined.     Medications:  Current Outpatient Medications   Medication     acetaminophen (TYLENOL) 500 MG tablet     albuterol (PROAIR HFA/PROVENTIL HFA/VENTOLIN HFA) 108 (90 Base) MCG/ACT inhaler     albuterol 90 MCG/ACT inhaler     baclofen (LIORESAL) 10 MG tablet     captopril (CAPOTEN) 50 MG tablet     fluticasone (FLOVENT DISKUS) 100 MCG/BLIST AEPB     omeprazole (PRILOSEC) 20 MG DR capsule     sertraline (ZOLOFT) 100 MG tablet     triamterene-hydrochlorothiazide (MAXZIDE-25) 37.5-25 MG per tablet     beclomethasone (QVAR) 40 MCG/ACT AERS IS A DISCONTINUED MEDICATION     beclomethasone HFA (QVAR REDIHALER) 40 MCG/ACT inhaler     BUPROPION HCL PO     Calcium Carbonate-Vit D-Min (CALTRATE 600+D PLUS PO)     clobetasol (TEMOVATE) 0.05 % external ointment     Control Gel Formula Dressing (DUODERM CGF DRESSING) MISC     fluorouracil (EFUDEX) 5 % external cream     gabapentin (NEURONTIN) 100 MG capsule     gabapentin 8 % GEL     Multiple Vitamins-Minerals (CENTRUM SILVER) per tablet     zinc oxide (DESITIN) 40 % external ointment     Current Facility-Administered Medications   Medication     lidocaine 1% with EPINEPHrine 1:100,000 injection 3 mL        Past Medical History:   Patient Active Problem List   Diagnosis     Urge incontinence     Urgency of urination     Post-operative state     Cerebral palsy (H)     Abdominal bloating     Asthma     Dehydration     Depression     Dysphagia, unspecified type     Essential hypertension     Gastroesophageal reflux disease, esophagitis presence not specified     Hypokalemia     Mild intermittent asthma without complication     Nausea     Osteoporosis     Cerebral palsy, unspecified type (H)     Major depressive disorder in remission, unspecified whether recurrent (H)     Past Medical History:   Diagnosis Date      Asthma     ALLERGIC RHINITIS     Cerebral palsy (H)      Cerebral palsy with spastic/ataxic diplegia      Chronic bronchitis (H)      Depression      DJD (degenerative joint disease)      Gastro-oesophageal reflux disease      Hypertension      Osteopenia         CC Marcus Santana MD  [place] [id number]  [place] [address] on close of this encounter.

## 2022-06-14 ENCOUNTER — OFFICE VISIT (OUTPATIENT)
Dept: DERMATOLOGY | Facility: CLINIC | Age: 77
End: 2022-06-14
Payer: COMMERCIAL

## 2022-06-14 DIAGNOSIS — L57.0 AK (ACTINIC KERATOSIS): ICD-10-CM

## 2022-06-14 DIAGNOSIS — L56.5 DSAP (DISSEMINATED SUPERFICIAL ACTINIC POROKERATOSIS): Primary | ICD-10-CM

## 2022-06-14 DIAGNOSIS — L89.152 PRESSURE INJURY OF SACRAL REGION, STAGE 2 (H): ICD-10-CM

## 2022-06-14 PROCEDURE — 99214 OFFICE O/P EST MOD 30 MIN: CPT | Mod: GC | Performed by: DERMATOLOGY

## 2022-06-14 RX ORDER — FLUOROURACIL 50 MG/G
CREAM TOPICAL 2 TIMES DAILY
Qty: 40 G | Refills: 3 | Status: SHIPPED | OUTPATIENT
Start: 2022-06-14

## 2022-06-14 ASSESSMENT — PAIN SCALES - GENERAL: PAINLEVEL: NO PAIN (0)

## 2022-06-14 NOTE — NURSING NOTE
Dermatology Rooming Note    Benji Shankar's goals for this visit include:   Chief Complaint   Patient presents with     Derm Problem     Pt is here for a 6m follow up lesion on legs, states that they come and go and are about the same.      Angela Rashid, Visit Facilitator

## 2022-06-14 NOTE — LETTER
6/14/2022       RE: Benji Shankar  [address]  [facility] [address]     Dear Colleague,    Thank you for referring your patient, Benji Shankar, to the [facility] [place] at [facility]. Please see a copy of my visit note below.    Formerly [facility] Dermatology Note  Encounter Date: Jun 14, 2022  Office Visit     Dermatology Problem List:  # Violaceous papules on lower extremities, bx consistent with hypertrophic AK  # Peripheral vascular disease  # Ulcer, scar, and mildly atypical epithelium with focal parakeratosis, R shin, L medial leg  ____________________________________________    Assessment & Plan:    # Actinic keratosis. Nose, arms, legs  - Biopsy of lower legs suggestive of hypertrophic actinic keratoses. Exam with overlying crust on leg lesions but not consistent with any malignant process, likely normal healing. Overall, biopsy sites healing well with no indications for LN therapy. Does have scaly, erythematous papules on nose and b/l arms c/w AKs so treated with LN today.  - Cryotherapy performed today (see procedure note(s) below).  - Sun protection: Counseled SPF30+ sunscreen  - For lesion right lower leg, apply vaseline then hydrocolloid dressing every 2-3 days until lesion heals over  - Will have return in 6 months for skin check        # {Diagnosesder:129224}.   {kkplans:317505}   - ***     Procedures Performed:   {kkprocedurenotes:236647}  {kkprocedurenotes:902249}    Follow-up: {kkfollowup:807775}    Staff and Scribe:     Scribe Disclosure:  I, Vicenta Valle, am serving as a scribe to document services personally performed by Dominic Triplett MD based on data collection and the provider's statements to me.     ***    ____________________________________________    CC: No chief complaint on file.      HPI:  Ms. Benji Shankar is a(n) 77 year old female who presents  today as a return patient for ***. Last seen by myself on 11/2/21, at which time the patient was treated with cryo for 10 AKs (nose, arms).          Patient is otherwise feeling well, without additional skin concerns.    Labs Reviewed:  Dermatopathology 7/2021     FINAL DIAGNOSIS:   A. Skin, right shin, shave:   - Ulcer, scar, and mildly atypical epithelium with focal parakeratosis -   (see comment and description)     B. Skin, left medial leg, shave:   - Ulcer, scar, and mildly atypical epithelium with focal parakeratosis -   (see comment and description)     COMMENT:   A-B. The findings present in these specimens are favored to represent   traumatized hypertrophic actinic   keratosis, though the epithelial atypia may be related entirely to   adjacency to the ulcer and prior trauma.   There is no evidence of carcinoma in the planes examined. Ongoing clinical    monitoring is recommended.    Physical Exam:  Vitals: There were no vitals taken for this visit.  SKIN: {kkSkinExam:615568}  - ***  - {Skin Exam Derm:327270}.   - {Skin Exam Derm:248646}.   - {Skin Exam Derm:720502}.   - No other lesions of concern on areas examined.     Medications:  Current Outpatient Medications   Medication     acetaminophen (ACETAMIN) 500 MG tablet     albuterol (VENTOLIN HFA) 108 (90 Base) MCG/ACT inhaler     albuterol 90 MCG/ACT inhaler     baclofen (LIORESAL) 10 MG tablet     beclomethasone (QVAR) 40 MCG/ACT inhaler     beclomethasone HFA (QVAR REDIHALER) 40 MCG/ACT inhaler     BUPROPION HCL PO     Calcium Carbonate-Vit D-Min (CALTRATE 600+D PLUS PO)     captopril (CAPOTEN) 50 MG tablet     clobetasol (TEMOVATE) 0.05 % external ointment     Control Gel Formula Dressing (DUODERM CGF DRESSING) MISC     fluorouracil (EFUDEX) 5 % external cream     fluticasone (FLOVENT DISKUS) 100 MCG/BLIST AEPB     gabapentin (NEURONTIN) 100 MG capsule     gabapentin 8 % GEL     Multiple Vitamins-Minerals (CENTRUM SILVER) per tablet     omeprazole  (PRILOSEC) 20 MG capsule     sertraline (ZOLOFT) 100 MG tablet     triamterene-hydrochlorothiazide (MAXZIDE-25) 37.5-25 MG per tablet     zinc oxide (DESITIN) 40 % external ointment     Current Facility-Administered Medications   Medication     lidocaine 1% with EPINEPHrine 1:100,000 injection 3 mL        Past Medical History:   Patient Active Problem List   Diagnosis     Urge incontinence     Urgency of urination     Post-operative state     Cerebral palsy (H)     Abdominal bloating     Asthma     Dehydration     Depression     Dysphagia, unspecified type     Essential hypertension     Gastroesophageal reflux disease, esophagitis presence not specified     Hypokalemia     Mild intermittent asthma without complication     Nausea     Osteoporosis     Cerebral palsy, unspecified type (H)     Major depressive disorder in remission, unspecified whether recurrent (H)     Past Medical History:   Diagnosis Date     Asthma     ALLERGIC RHINITIS     Cerebral palsy (H)      Cerebral palsy with spastic/ataxic diplegia      Chronic bronchitis (H)      Depression      DJD (degenerative joint disease)      Gastro-oesophageal reflux disease      Hypertension      Osteopenia         CC Marcus Santana MD  [place] [id number]  [place] [address] on close of this encounter.

## 2022-06-14 NOTE — PATIENT INSTRUCTIONS
Recommendations for dry skin and dermatitis   1. Bathe or shower daily in lukewarm water  2. Use a gentle non-soap detergent cleanser  - Soaps are alkaline (which can irritate sensitive skin) and remove natural moisturizing factors   - Recommended products, in no particular order, include:   - Bars:    - Aveeno Moisturizing Bar    - Cetaphil Gentle Cleansing Bar    - Dove Sensitive Skin Unscented Beauty Bar    - Olay Ultra Moisture Bar   - Liquid Cleansers:    - Aquanil Cleanser    - CeraVe Hydrating Cleanser    - Cetaphil Gentle Skin Cleanser  - Avoid scented soaps or bath additives unless your doctor tells you otherwise  - Focus on washing the face, underarms, and underwear areas; other sites usually do not need frequent washing  3. Rinse off thoroughly, then pat dry until skin is slightly damp  4. Apply moisturizer to damp skin within 3-5 minutes of exiting the bath/shower  - Recommended products, in no particular order, include:   - Lotions (thinner/lighter, but may be less effective)    - AmLactin Cerapeutic Restoring Body Lotion    - CeraVe Facial Moisturizing Lotion (AM and/or PM)    - Lubriderm Advanced Therapy Lotion   - Creams (thicker, likely the best balance of effectiveness and feel)    - AmLactin Ultra Hydrating Body Cream    - Aveeno Eczema Therapy Moisturizing Cream    - Aveeno Eczema Therapy Itch Relief Balm    - CeraVe Itch Relief Moisturizing Cream   - Ointments (thickest)    - Vaseline  5. If prescribed a topical steroid medication, this may be applied before or after the moisturizer (whichever order you prefer)  6. Reapply moisturizer one or two additional times throughout the day when dry skin is present; once this improves, reduce to daily or every other day as needed to prevent recurrence  7. If dry skin or dermatitis is present on the hands, keep moisturizer near the sink and apply after washing and drying your hands  8. A humidifier may be helpful during the winter months (when ambient  humidity is very low)

## 2022-06-16 ENCOUNTER — TELEPHONE (OUTPATIENT)
Dept: WOUND CARE | Facility: CLINIC | Age: 77
End: 2022-06-16

## 2022-06-16 NOTE — TELEPHONE ENCOUNTER
Consult received via work queue from Dominic Triplett MD (dermatologist) for wound of the sacrum    Please schedule with Cris Doss PA-C (or Dr. Petit or Dr. Carrillo) at [facility] [place] for next available appointment.    Is patient a LESLIE lift? Yes     Routing to  Wound Healing Scheduling.

## 2022-09-15 ENCOUNTER — TELEPHONE (OUTPATIENT)
Dept: PHYSICAL MEDICINE AND REHAB | Facility: CLINIC | Age: 77
End: 2022-09-15

## 2022-09-15 NOTE — TELEPHONE ENCOUNTER
[facility] Call Center    Phone Message    May a detailed message be left on voicemail: yes     Reason for Call: Other: Lea at [employer] called to check on the status of an order that needed to be sigen for Pt to get her wheelchair repaired.  She states that this was faxed on 9/6/22.    Please call Murtaza sanders [facility] back at [phone] to advise.    Action Taken: Message routed to:  [facility]): PMR    Travel Screening: Not Applicable